# Patient Record
Sex: FEMALE | Race: WHITE | NOT HISPANIC OR LATINO | Employment: OTHER | URBAN - METROPOLITAN AREA
[De-identification: names, ages, dates, MRNs, and addresses within clinical notes are randomized per-mention and may not be internally consistent; named-entity substitution may affect disease eponyms.]

---

## 2017-10-30 ENCOUNTER — HOSPITAL ENCOUNTER (OUTPATIENT)
Dept: MAMMOGRAPHY | Facility: CLINIC | Age: 77
Discharge: HOME | End: 2017-10-30
Attending: FAMILY MEDICINE | Admitting: FAMILY MEDICINE
Payer: COMMERCIAL

## 2017-10-30 DIAGNOSIS — Z12.31 VISIT FOR SCREENING MAMMOGRAM: ICD-10-CM

## 2017-10-30 PROCEDURE — G0202 SCR MAMMO BI INCL CAD: HCPCS

## 2017-11-03 ENCOUNTER — OFFICE VISIT (OUTPATIENT)
Dept: FAMILY MEDICINE | Facility: CLINIC | Age: 77
End: 2017-11-03
Payer: COMMERCIAL

## 2017-11-03 VITALS
TEMPERATURE: 97.7 F | HEIGHT: 64 IN | HEART RATE: 72 BPM | BODY MASS INDEX: 31.86 KG/M2 | WEIGHT: 186.6 LBS | SYSTOLIC BLOOD PRESSURE: 138 MMHG | DIASTOLIC BLOOD PRESSURE: 80 MMHG

## 2017-11-03 DIAGNOSIS — E78.5 HYPERLIPIDEMIA LDL GOAL <130: ICD-10-CM

## 2017-11-03 DIAGNOSIS — G43.719 INTRACTABLE CHRONIC MIGRAINE WITHOUT AURA AND WITHOUT STATUS MIGRAINOSUS: Primary | ICD-10-CM

## 2017-11-03 DIAGNOSIS — Z23 NEED FOR PROPHYLACTIC VACCINATION AND INOCULATION AGAINST INFLUENZA: ICD-10-CM

## 2017-11-03 LAB
ALBUMIN SERPL-MCNC: 3.9 G/DL (ref 3.4–5)
ALP SERPL-CCNC: 64 U/L (ref 40–150)
ALT SERPL W P-5'-P-CCNC: 23 U/L (ref 0–50)
ANION GAP SERPL CALCULATED.3IONS-SCNC: 5 MMOL/L (ref 3–14)
AST SERPL W P-5'-P-CCNC: 21 U/L (ref 0–45)
BASOPHILS # BLD AUTO: 0 10E9/L (ref 0–0.2)
BASOPHILS NFR BLD AUTO: 0.4 %
BILIRUB DIRECT SERPL-MCNC: 0.1 MG/DL (ref 0–0.2)
BILIRUB SERPL-MCNC: 0.5 MG/DL (ref 0.2–1.3)
BUN SERPL-MCNC: 25 MG/DL (ref 7–30)
CALCIUM SERPL-MCNC: 9.1 MG/DL (ref 8.5–10.1)
CHLORIDE SERPL-SCNC: 110 MMOL/L (ref 94–109)
CHOLEST SERPL-MCNC: 126 MG/DL
CO2 SERPL-SCNC: 28 MMOL/L (ref 20–32)
CREAT SERPL-MCNC: 0.9 MG/DL (ref 0.52–1.04)
DIFFERENTIAL METHOD BLD: NORMAL
EOSINOPHIL # BLD AUTO: 0.2 10E9/L (ref 0–0.7)
EOSINOPHIL NFR BLD AUTO: 2.9 %
ERYTHROCYTE [DISTWIDTH] IN BLOOD BY AUTOMATED COUNT: 13.1 % (ref 10–15)
GFR SERPL CREATININE-BSD FRML MDRD: 61 ML/MIN/1.7M2
GLUCOSE SERPL-MCNC: 93 MG/DL (ref 70–99)
HCT VFR BLD AUTO: 41.9 % (ref 35–47)
HDLC SERPL-MCNC: 54 MG/DL
HGB BLD-MCNC: 13.5 G/DL (ref 11.7–15.7)
LDLC SERPL CALC-MCNC: 53 MG/DL
LYMPHOCYTES # BLD AUTO: 1.7 10E9/L (ref 0.8–5.3)
LYMPHOCYTES NFR BLD AUTO: 32.1 %
MCH RBC QN AUTO: 30.9 PG (ref 26.5–33)
MCHC RBC AUTO-ENTMCNC: 32.2 G/DL (ref 31.5–36.5)
MCV RBC AUTO: 96 FL (ref 78–100)
MONOCYTES # BLD AUTO: 0.5 10E9/L (ref 0–1.3)
MONOCYTES NFR BLD AUTO: 9.4 %
NEUTROPHILS # BLD AUTO: 2.9 10E9/L (ref 1.6–8.3)
NEUTROPHILS NFR BLD AUTO: 55.2 %
NONHDLC SERPL-MCNC: 72 MG/DL
PLATELET # BLD AUTO: 211 10E9/L (ref 150–450)
POTASSIUM SERPL-SCNC: 4.4 MMOL/L (ref 3.4–5.3)
PROT SERPL-MCNC: 7.2 G/DL (ref 6.8–8.8)
RBC # BLD AUTO: 4.37 10E12/L (ref 3.8–5.2)
SODIUM SERPL-SCNC: 143 MMOL/L (ref 133–144)
TRIGL SERPL-MCNC: 95 MG/DL
TSH SERPL DL<=0.005 MIU/L-ACNC: 3.02 MU/L (ref 0.4–4)
WBC # BLD AUTO: 5.2 10E9/L (ref 4–11)

## 2017-11-03 PROCEDURE — 36415 COLL VENOUS BLD VENIPUNCTURE: CPT | Performed by: FAMILY MEDICINE

## 2017-11-03 PROCEDURE — 85025 COMPLETE CBC W/AUTO DIFF WBC: CPT | Performed by: FAMILY MEDICINE

## 2017-11-03 PROCEDURE — 99214 OFFICE O/P EST MOD 30 MIN: CPT | Mod: 25 | Performed by: FAMILY MEDICINE

## 2017-11-03 PROCEDURE — 80076 HEPATIC FUNCTION PANEL: CPT | Performed by: FAMILY MEDICINE

## 2017-11-03 PROCEDURE — G0008 ADMIN INFLUENZA VIRUS VAC: HCPCS | Performed by: FAMILY MEDICINE

## 2017-11-03 PROCEDURE — 80061 LIPID PANEL: CPT | Performed by: FAMILY MEDICINE

## 2017-11-03 PROCEDURE — 80048 BASIC METABOLIC PNL TOTAL CA: CPT | Performed by: FAMILY MEDICINE

## 2017-11-03 PROCEDURE — 84443 ASSAY THYROID STIM HORMONE: CPT | Performed by: FAMILY MEDICINE

## 2017-11-03 PROCEDURE — 90662 IIV NO PRSV INCREASED AG IM: CPT | Performed by: FAMILY MEDICINE

## 2017-11-03 RX ORDER — BUTALBITAL, ACETAMINOPHEN AND CAFFEINE 50; 325; 40 MG/1; MG/1; MG/1
1-2 TABLET ORAL EVERY 4 HOURS PRN
Qty: 100 TABLET | Refills: 2 | Status: SHIPPED | OUTPATIENT
Start: 2017-11-03 | End: 2017-11-03

## 2017-11-03 RX ORDER — BUTALBITAL, ACETAMINOPHEN AND CAFFEINE 50; 325; 40 MG/1; MG/1; MG/1
1-2 TABLET ORAL EVERY 4 HOURS PRN
Qty: 100 TABLET | Refills: 2 | Status: SHIPPED | OUTPATIENT
Start: 2017-11-03 | End: 2018-11-20

## 2017-11-03 RX ORDER — SIMVASTATIN 20 MG
20 TABLET ORAL AT BEDTIME
Qty: 90 TABLET | Refills: 3 | Status: SHIPPED | OUTPATIENT
Start: 2017-11-03 | End: 2018-11-20

## 2017-11-03 RX ORDER — PROCHLORPERAZINE MALEATE 5 MG
5 TABLET ORAL 3 TIMES DAILY PRN
Qty: 30 TABLET | Refills: 2 | Status: SHIPPED | OUTPATIENT
Start: 2017-11-03 | End: 2018-11-20

## 2017-11-03 NOTE — NURSING NOTE
"No chief complaint on file.      Initial /80 (BP Location: Right arm, Patient Position: Chair, Cuff Size: Adult Large)  Pulse 72  Temp 97.7  F (36.5  C) (Tympanic)  Ht 5' 4\" (1.626 m)  Wt 186 lb 9.6 oz (84.6 kg)  BMI 32.03 kg/m2 Estimated body mass index is 32.03 kg/(m^2) as calculated from the following:    Height as of this encounter: 5' 4\" (1.626 m).    Weight as of this encounter: 186 lb 9.6 oz (84.6 kg).  Medication Reconciliation: complete    Health Maintenance that is potentially due pending provider review:  Mammogram and Colonoscopy/FIT    Pt declines to have health maintenence.    Is there anyone who you would like to be able to receive your results? No  If yes have patient fill out BENTON      "

## 2017-11-03 NOTE — PATIENT INSTRUCTIONS
Labs today     Flu shot today     Medications refilled today for one year     Recheck with me in one year sooner if needed

## 2017-11-03 NOTE — MR AVS SNAPSHOT
After Visit Summary   11/3/2017    Susana Duenas    MRN: [id number]           Patient Information     Date Of Birth          1940        Visit Information        Provider Department      11/3/2017 8:00 AM Heena Ny MD [facility]        Today's Diagnoses     Need for prophylactic vaccination and inoculation against influenza    -  1    Intractable chronic migraine without aura and without status migrainosus        Hyperlipidemia LDL goal <130          Care Instructions    Labs today     Flu shot today     Medications refilled today for one year     Recheck with me in one year sooner if needed          Follow-ups after your visit        Who to contact     If you have questions or need follow up information about today's clinic visit or your schedule please contact [facility] directly at [phone].  Normal or non-critical lab and imaging results will be communicated to you by [employer], letter or phone within 4 business days after the clinic has received the results. If you do not hear from us within 7 days, please contact the clinic through [employer] or phone. If you have a critical or abnormal lab result, we will notify you by phone as soon as possible.  Submit refill requests through [employer] or call your pharmacy and they will forward the refill request to us. Please allow 3 business days for your refill to be completed.          Additional Information About Your Visit        [employer] Information     [employer] gives you secure access to your electronic health record. If you see a primary care provider, you can also send messages to your care team and make appointments. If you have questions, please call your primary care clinic.  If you do not have a primary care provider, please call [phone] and they will assist you.        Care EveryWhere ID     This is your Care EveryWhere ID. This could be used by other organizations to access your [place]  "medical records  [id number]        Your Vitals Were     Pulse Temperature Height BMI (Body Mass Index)          72 97.7  F (36.5  C) (Tympanic) 5' 4\" (1.626 m) 32.03 kg/m2         Blood Pressure from Last 3 Encounters:   11/03/17 138/80   11/14/16 138/84   10/30/15 136/80    Weight from Last 3 Encounters:   11/03/17 186 lb 9.6 oz (84.6 kg)   11/14/16 187 lb 9.6 oz (85.1 kg)   10/30/15 189 lb 6.4 oz (85.9 kg)              We Performed the Following     ADMIN INFLUENZA (For MEDICARE Patients ONLY) []     Basic metabolic panel     CBC with platelets differential     FLU VACCINE, INCREASED ANTIGEN, PRESV FREE, AGE 65+ [96496]     Hepatic panel     Lipid panel reflex to direct LDL Fasting     TSH with free T4 reflex          Where to get your medicines      These medications were sent to [facility] [address] [place], [place] [address] [facility]  [address] [facility] [address]     Phone:  [phone]     prochlorperazine 5 MG tablet    simvastatin 20 MG tablet         Some of these will need a paper prescription and others can be bought over the counter.  Ask your nurse if you have questions.     Bring a paper prescription for each of these medications     butalbital-acetaminophen-caffeine -40 MG per tablet          Primary Care Provider Office Phone # Fax #    Heena Ny [phone] [address] [place] [address]        Equal Access to Services     ÁNGELA SNYDER AH: Hadii aad ku hadasho Soomaali, waaxda luqadaha, qaybta kaalmada adeegyada, waxay vaishaliin felicia guido. So [facility] [phone].    ATENCIÓN: Si habla español, tiene a sanches disposición servicios gratuitos de asistencia lingüística. Llame al [phone].    We comply with applicable federal civil rights laws and [place] laws. We do not discriminate on the basis of race, color, national origin, age, disability, sex, sexual orientation, or gender identity.            Thank you!     " Thank you for choosing [facility]  for your care. Our goal is always to provide you with excellent care. Hearing back from our patients is one way we can continue to improve our services. Please take a few minutes to complete the written survey that you may receive in the mail after your visit with us. Thank you!             Your Updated Medication List - Protect others around you: Learn how to safely use, store and throw away your medicines at www.disposemymeds.org.          This list is accurate as of: 11/3/17  8:32 AM.  Always use your most recent med list.                   Brand Name Dispense Instructions for use Diagnosis    ALEVE 220 MG capsule   Generic drug:  naproxen sodium      1 CAPSULE EVERY 12 HOURS AS NEEDED        BL FLAX SEED OIL 1000 MG Caps      2 tablets daily        butalbital-acetaminophen-caffeine -40 MG per tablet    FIORICET/ESGIC    100 tablet    Take 1-2 tablets by mouth every 4 hours as needed for pain    Intractable chronic migraine without aura and without status migrainosus       GRAPE SEED PO      300mg daily        other medical supplies      Multivitamin packet including calcium and Vit D.    Migraine headache       prochlorperazine 5 MG tablet    COMPAZINE    30 tablet    Take 1 tablet (5 mg) by mouth 3 times daily as needed for nausea.    Intractable chronic migraine without aura and without status migrainosus       simvastatin 20 MG tablet    ZOCOR    90 tablet    Take 1 tablet (20 mg) by mouth At Bedtime    Hyperlipidemia LDL goal <130

## 2017-11-03 NOTE — PROGRESS NOTES
"  SUBJECTIVE:   Susana Duenas is a 77 year old female who presents to clinic today for the following health issues:      Hyperlipidemia Follow-Up      Rate your low fat/cholesterol diet?: fair    Taking statin?  Yes, no muscle aches from statin    Other lipid medications/supplements?:  none    Migraine Follow-Up    Headaches symptoms:  Stable     Frequency: varies, sometimes a week, sometimes 2 days, sometimes longer     Duration of headaches: a couple hours with meds    Able to do normal daily activities/work with migraines: No -     Rescue/Relief medication:fioricet              Effectiveness: moderate relief    Preventative medication: None    Neurologic complications: No new stroke-like symptoms, loss of vision or speech, numbness or weakness    In the past 4 weeks, how often have you gone to Urgent Care or the emergency room because of your headaches?  0          Amount of exercise or physical activity: None    Problems taking medications regularly: No    Medication side effects: none  Diet: regular (no restrictions)          Problem list and histories reviewed & adjusted, as indicated.  Additional history: as documented    Labs reviewed in EPIC    Reviewed and updated as needed this visit by clinical staffTobacco  Allergies  Meds  Problems  Med Hx  Surg Hx  Fam Hx  Soc Hx        Reviewed and updated as needed this visit by Provider  Allergies  Meds  Problems         ROS:  Constitutional, HEENT, cardiovascular, pulmonary, gi and gu systems are negative, except as otherwise noted.      OBJECTIVE:                                                    /80 (BP Location: Right arm, Patient Position: Chair, Cuff Size: Adult Large)  Pulse 72  Temp 97.7  F (36.5  C) (Tympanic)  Ht 5' 4\" (1.626 m)  Wt 186 lb 9.6 oz (84.6 kg)  BMI 32.03 kg/m2  Body mass index is 32.03 kg/(m^2).  GENERAL APPEARANCE: healthy, alert and no distress  RESP: lungs clear to auscultation - no rales, rhonchi or wheezes  CV: " regular rates and rhythm, normal S1 S2, no S3 or S4 and no murmur, click or rub  MS: extremities normal- no gross deformities noted  PSYCH: mentation appears normal and affect normal/bright         ASSESSMENT/PLAN:                                                    1. Intractable chronic migraine without aura and without status migrainosus  Stable no change in treatment plan.   - prochlorperazine (COMPAZINE) 5 MG tablet; Take 1 tablet (5 mg) by mouth 3 times daily as needed for nausea.  Dispense: 30 tablet; Refill: 2  - Basic metabolic panel  - CBC with platelets differential  - TSH with free T4 reflex  - butalbital-acetaminophen-caffeine (FIORICET/ESGIC) -40 MG per tablet; Take 1-2 tablets by mouth every 4 hours as needed for pain  Dispense: 100 tablet; Refill: 2    2. Hyperlipidemia LDL goal <130  Stable no change in treatment plan.   - simvastatin (ZOCOR) 20 MG tablet; Take 1 tablet (20 mg) by mouth At Bedtime  Dispense: 90 tablet; Refill: 3  - Hepatic panel  - Lipid panel reflex to direct LDL Fasting    3. Need for prophylactic vaccination and inoculation against influenza    - FLU VACCINE, INCREASED ANTIGEN, PRESV FREE, AGE 65+ [82861]  - ADMIN INFLUENZA (For MEDICARE Patients ONLY) []      Patient Instructions   Labs today     Flu shot today     Medications refilled today for one year     Recheck with me in one year sooner if needed    Risks, benefits, side effects and rationale for treatment plan fully discussed with the patient and understanding expressed.     Heena Ny MD  [facility]    Injectable Influenza Immunization Documentation    1.  Is the person to be vaccinated sick today?   No    2. Does the person to be vaccinated have an allergy to a component   of the vaccine?   No  Egg Allergy Algorithm Link    3. Has the person to be vaccinated ever had a serious reaction   to influenza vaccine in the past?   No    4. Has the person to be vaccinated ever had  Guillain-Barré syndrome?   No    Form completed by patient

## 2017-11-06 ENCOUNTER — TELEPHONE (OUTPATIENT)
Dept: FAMILY MEDICINE | Facility: CLINIC | Age: 77
End: 2017-11-06

## 2017-11-08 ENCOUNTER — TELEPHONE (OUTPATIENT)
Dept: FAMILY MEDICINE | Facility: CLINIC | Age: 77
End: 2017-11-08

## 2017-11-08 NOTE — TELEPHONE ENCOUNTER
Please call pt and let her know that her migraine med is not covered by her insurance   I would recommend trying naprosyn or ibuprofen with compazine that she has and a caffeine containing beverage like coke is she willing to do this?  Las we talked she rarely had migraines

## 2017-11-08 NOTE — TELEPHONE ENCOUNTER
esgic is nolonger formulary per [facility].  Preferred are meloxicam, Ibu, Naproxen or imitrex and naratriptan. Please fax new rx if OK to change.

## 2017-11-09 NOTE — TELEPHONE ENCOUNTER
Left message to call please give her message below from Dr. Ny. She can also check with pharmacy and see how mush it would cost to pay out of pocket for 10 or 20 pills instead of 100?

## 2018-06-28 ENCOUNTER — OFFICE VISIT (OUTPATIENT)
Dept: FAMILY MEDICINE | Facility: CLINIC | Age: 78
End: 2018-06-28
Payer: COMMERCIAL

## 2018-06-28 VITALS
HEIGHT: 64 IN | HEART RATE: 72 BPM | DIASTOLIC BLOOD PRESSURE: 76 MMHG | SYSTOLIC BLOOD PRESSURE: 134 MMHG | TEMPERATURE: 98.8 F | WEIGHT: 187 LBS | BODY MASS INDEX: 31.92 KG/M2 | RESPIRATION RATE: 18 BRPM

## 2018-06-28 DIAGNOSIS — H92.01 MASTOID PAIN, RIGHT: Primary | ICD-10-CM

## 2018-06-28 DIAGNOSIS — H61.21 IMPACTED CERUMEN OF RIGHT EAR: ICD-10-CM

## 2018-06-28 DIAGNOSIS — G43.719 INTRACTABLE CHRONIC MIGRAINE WITHOUT AURA AND WITHOUT STATUS MIGRAINOSUS: ICD-10-CM

## 2018-06-28 PROCEDURE — 69210 REMOVE IMPACTED EAR WAX UNI: CPT | Mod: RT | Performed by: PHYSICIAN ASSISTANT

## 2018-06-28 NOTE — MR AVS SNAPSHOT
After Visit Summary   6/28/2018    Susana Duenas    MRN: [id number]           Patient Information     Date Of Birth          1940        Visit Information        Provider Department      6/28/2018 10:20 AM Tiffanie Mayes PA-C [facility]        Care Instructions    Seems unlikely trigeminal neuralgia, but is not ear drum infection, unlikely ear canal infection (red now that we removed wax, but didn't seem red before), not shingles of the ear.  Unlikely infection of mastoid despite this is where it hurts, since no fever.    Ice or heat   Can try acetaminophen, up to 3 of the regular strength 325 mg tabs or 2 of the 500 mg tabs, every 4-6 hrs but max 4 doses per day  Can overlap acetaminophen with either ibuprofen or naproxen, but not both  Ibuprofen 2 of the 200 mg tabs, every 4-6 hrs, take with food   OR naproxen 1 of the 220 mg tabs, every 8-12 hrs, take with food    Call to update tomorrow if still hurting badly          Follow-ups after your visit        Who to contact     If you have questions or need follow up information about today's clinic visit or your schedule please contact [facility] directly at [phone].  Normal or non-critical lab and imaging results will be communicated to you by [employer], letter or phone within 4 business days after the clinic has received the results. If you do not hear from us within 7 days, please contact the clinic through [employer] or phone. If you have a critical or abnormal lab result, we will notify you by phone as soon as possible.  Submit refill requests through [employer] or call your pharmacy and they will forward the refill request to us. Please allow 3 business days for your refill to be completed.          Additional Information About Your Visit        [employer] Information     [employer] gives you secure access to your electronic health record. If you see a primary care provider, you can also send messages to  "your care team and make appointments. If you have questions, please call your primary care clinic.  If you do not have a primary care provider, please call [phone] and they will assist you.        Care EveryWhere ID     This is your Care EveryWhere ID. This could be used by other organizations to access your [place] medical records  [id number]        Your Vitals Were     Pulse Temperature Respirations Height BMI (Body Mass Index)       72 98.8  F (37.1  C) (Tympanic) 18 5' 4\" (1.626 m) 32.1 kg/m2        Blood Pressure from Last 3 Encounters:   06/28/18 134/76   11/03/17 138/80   11/14/16 138/84    Weight from Last 3 Encounters:   06/28/18 187 lb (84.8 kg)   11/03/17 186 lb 9.6 oz (84.6 kg)   11/14/16 187 lb 9.6 oz (85.1 kg)              Today, you had the following     No orders found for display       Primary Care Provider Office Phone # Fax #    Heena Ny [phone] [address] [place] [address]        Equal Access to Services     [facility]rem[facility] Medical CenterELISHA AH: Hadii aad ku hadasho Somelaniaali, waaxda luqadaha, qaybta kaalmada adeegyada, perlita alberts . So [facility] [phone].    ATENCIÓN: Si habla español, tiene a sanches disposición servicios gratuitos de asistencia lingüística. Llame al [phone].    We comply with applicable federal civil rights laws and [place] laws. We do not discriminate on the basis of race, color, national origin, age, disability, sex, sexual orientation, or gender identity.            Thank you!     Thank you for choosing [facility]  for your care. Our goal is always to provide you with excellent care. Hearing back from our patients is one way we can continue to improve our services. Please take a few minutes to complete the written survey that you may receive in the mail after your visit with us. Thank you!             Your Updated Medication List - Protect others around you: Learn how to safely use, store and " throw away your medicines at www.disposemymeds.org.          This list is accurate as of 6/28/18 11:54 AM.  Always use your most recent med list.                   Brand Name Dispense Instructions for use Diagnosis    ALEVE 220 MG capsule   Generic drug:  naproxen sodium      1 CAPSULE EVERY 12 HOURS AS NEEDED        BL FLAX SEED OIL 1000 MG Caps      2 tablets daily        butalbital-acetaminophen-caffeine -40 MG per tablet    FIORICET/ESGIC    100 tablet    Take 1-2 tablets by mouth every 4 hours as needed for pain    Intractable chronic migraine without aura and without status migrainosus       GRAPE SEED PO      300mg daily        other medical supplies      Multivitamin packet including calcium and Vit D.    Migraine headache       prochlorperazine 5 MG tablet    COMPAZINE    30 tablet    Take 1 tablet (5 mg) by mouth 3 times daily as needed for nausea.    Intractable chronic migraine without aura and without status migrainosus       simvastatin 20 MG tablet    ZOCOR    90 tablet    Take 1 tablet (20 mg) by mouth At Bedtime    Hyperlipidemia LDL goal <130

## 2018-06-28 NOTE — PROGRESS NOTES
SUBJECTIVE:   Susana Duenas is a 77 year old female who presents to clinic today for the following health issues:    Concern - Ear pain  Onset: two days    Description:   Patient's right ear started hurting last night. It kept her awake all night.    Intensity: moderate    Progression of Symptoms: slightly improving    Accompanying Signs & Symptoms:  Shooting pain    Previous history of similar problem:   none    Precipitating factors:   Worsened by: none    Alleviating factors:  Improved by: none    Therapies Tried and outcome: none    Started yesterday, not too bad, but then couldn't sleep due to shooting pain.  Behind ear predominantly and in ear somewhat but she mostly points to tragus, no pain at antitragus.  Hearing ok.  No fever.  No history jaw troubles or pain in face.  Does admit to being lifelong teeth clencher and  but no history TMJ.  No recent URI or seasonal allergies.  Somewhat relates symptoms as being similar to 2011 pain at mastoid which Dr Lara had tentatively diagnosed as trigeminal neuralgia - patient doesn't remember this so clearly but thinks simply resolved.  Has symptoms migraines, but these pains very differenet.      Problem list and histories reviewed & adjusted, as indicated.  Additional history: as documented    BP Readings from Last 3 Encounters:   06/28/18 134/76   11/03/17 138/80   11/14/16 138/84    Wt Readings from Last 3 Encounters:   06/28/18 187 lb (84.8 kg)   11/03/17 186 lb 9.6 oz (84.6 kg)   11/14/16 187 lb 9.6 oz (85.1 kg)         Labs reviewed in EPIC    Reviewed and updated as needed this visit by clinical staff  Tobacco  Allergies  Meds  Problems  Med Hx  Surg Hx  Fam Hx  Soc Hx        Reviewed and updated as needed this visit by Provider  Tobacco  Allergies  Meds  Problems  Med Hx  Surg Hx  Fam Hx  Soc Hx          ROS:  Constitutional, HEENT, neuro systems are negative, except as otherwise noted.    OBJECTIVE:     /76 (BP Location: Right  "arm, Cuff Size: Adult Regular)  Pulse 72  Temp 98.8  F (37.1  C) (Tympanic)  Resp 18  Ht 5' 4\" (1.626 m)  Wt 187 lb (84.8 kg)  BMI 32.1 kg/m2  Body mass index is 32.1 kg/(m^2).  GENERAL: healthy, alert and no distress  HENT: ear canal and TM's normal L, R canal with cerumen impaction partially lavaged by CMA then manually removed by me, after lavage note considerable canal erythema that is was not visible in portion of canal visible before cerumen removal but no edema, nasal mucosa unremarkable  NECK: no adenopathy, no asymmetry, masses, or scars     11/2017 GFR 61    ASSESSMENT/PLAN:       ICD-10-CM    1. Mastoid pain, right H92.01    2. Impacted cerumen of right ear H61.21 HC REMOVAL IMPACTED CERUMEN IRRIGATION/LVG UNILAT   3. Intractable chronic migraine without aura and without status migrainosus G43.719      Patient and patient care were discussed with Ricci Moncada MD.  Patient Instructions   Seems unlikely trigeminal neuralgia, but is not ear drum infection, unlikely ear canal infection (red now that we removed wax, but didn't seem red before), not shingles of the ear.  Unlikely infection of mastoid despite this is where it hurts, since no fever.    Ice or heat   Can try acetaminophen, up to 3 of the regular strength 325 mg tabs or 2 of the 500 mg tabs, every 4-6 hrs but max 4 doses per day  Can overlap acetaminophen with either ibuprofen or naproxen, but not both  Ibuprofen 2 of the 200 mg tabs, every 4-6 hrs, take with food   OR naproxen 1 of the 220 mg tabs, every 8-12 hrs, take with food    Call to update tomorrow if still hurting badly    Dr Moncada aware of this issue      Tiffanie Mayes PA-C  [facility]  "

## 2018-06-28 NOTE — PATIENT INSTRUCTIONS
Seems unlikely trigeminal neuralgia, but is not ear drum infection, unlikely ear canal infection (red now that we removed wax, but didn't seem red before), not shingles of the ear.  Unlikely infection of mastoid despite this is where it hurts, since no fever.    Ice or heat   Can try acetaminophen, up to 3 of the regular strength 325 mg tabs or 2 of the 500 mg tabs, every 4-6 hrs but max 4 doses per day  Can overlap acetaminophen with either ibuprofen or naproxen, but not both  Ibuprofen 2 of the 200 mg tabs, every 4-6 hrs, take with food   OR naproxen 1 of the 220 mg tabs, every 8-12 hrs, take with food    Call to update tomorrow if still hurting badly    Dr Moncada aware of this issue

## 2018-06-28 NOTE — NURSING NOTE
"Chief Complaint   Patient presents with     Otalgia       Initial /76 (BP Location: Right arm, Cuff Size: Adult Regular)  Pulse 72  Temp 98.8  F (37.1  C) (Tympanic)  Resp 18  Ht 5' 4\" (1.626 m)  Wt 187 lb (84.8 kg)  BMI 32.1 kg/m2 Estimated body mass index is 32.1 kg/(m^2) as calculated from the following:    Height as of this encounter: 5' 4\" (1.626 m).    Weight as of this encounter: 187 lb (84.8 kg).      Health Maintenance that is potentially due pending provider review:  NONE    Is there anyone who you would like to be able to receive your results? No  If yes have patient fill out BENTON      "

## 2018-09-11 ENCOUNTER — RADIANT APPOINTMENT (OUTPATIENT)
Dept: GENERAL RADIOLOGY | Facility: CLINIC | Age: 78
End: 2018-09-11
Attending: FAMILY MEDICINE
Payer: COMMERCIAL

## 2018-09-11 ENCOUNTER — OFFICE VISIT (OUTPATIENT)
Dept: FAMILY MEDICINE | Facility: CLINIC | Age: 78
End: 2018-09-11
Payer: COMMERCIAL

## 2018-09-11 VITALS
OXYGEN SATURATION: 94 % | WEIGHT: 186 LBS | HEIGHT: 64 IN | DIASTOLIC BLOOD PRESSURE: 70 MMHG | TEMPERATURE: 99.2 F | HEART RATE: 80 BPM | SYSTOLIC BLOOD PRESSURE: 134 MMHG | BODY MASS INDEX: 31.76 KG/M2

## 2018-09-11 DIAGNOSIS — M25.552 HIP PAIN, LEFT: ICD-10-CM

## 2018-09-11 DIAGNOSIS — M25.552 HIP PAIN, LEFT: Primary | ICD-10-CM

## 2018-09-11 PROCEDURE — 73523 X-RAY EXAM HIPS BI 5/> VIEWS: CPT | Mod: FY

## 2018-09-11 PROCEDURE — 99213 OFFICE O/P EST LOW 20 MIN: CPT | Performed by: FAMILY MEDICINE

## 2018-09-11 RX ORDER — MELOXICAM 7.5 MG/1
7.5 TABLET ORAL DAILY
Qty: 21 TABLET | Refills: 1 | Status: SHIPPED | DISCHARGE
Start: 2018-09-11 | End: 2018-11-20

## 2018-09-11 NOTE — MR AVS SNAPSHOT
After Visit Summary   9/11/2018    Susana Duenas    MRN: [id number]           Patient Information     Date Of Birth          1940        Visit Information        Provider Department      9/11/2018 1:40 PM Ricci Laird MD [facility]        Today's Diagnoses     Hip pain, left    -  1      Care Instructions    1. I will call once radiology sees the images.    2. This looks more like bursitis than arthritis     3. Use the medication for three weeks and come back if not improved.          Follow-ups after your visit        Who to contact     If you have questions or need follow up information about today's clinic visit or your schedule please contact [facility] directly at [phone].  Normal or non-critical lab and imaging results will be communicated to you by Ecolibriumhart, letter or phone within 4 business days after the clinic has received the results. If you do not hear from us within 7 days, please contact the clinic through Ecolibriumhart or phone. If you have a critical or abnormal lab result, we will notify you by phone as soon as possible.  Submit refill requests through Predictify or call your pharmacy and they will forward the refill request to us. Please allow 3 business days for your refill to be completed.          Additional Information About Your Visit        MyChart Information     Predict[employer] gives you secure access to your electronic health record. If you see a primary care provider, you can also send messages to your care team and make appointments. If you have questions, please call your primary care clinic.  If you do not have a primary care provider, please call [phone] and they will assist you.        Care EveryWhere ID     This is your Care EveryWhere ID. This could be used by other organizations to access your [place] medical records  [id number]        Your Vitals Were     Pulse Temperature Height Pulse Oximetry BMI (Body Mass  "Index)       80 99.2  F (37.3  C) (Tympanic) 5' 4\" (1.626 m) 94% 31.93 kg/m2        Blood Pressure from Last 3 Encounters:   09/11/18 134/70   06/28/18 134/76   11/03/17 138/80    Weight from Last 3 Encounters:   09/11/18 186 lb (84.4 kg)   06/28/18 187 lb (84.8 kg)   11/03/17 186 lb 9.6 oz (84.6 kg)                 Today's Medication Changes          These changes are accurate as of 9/11/18  2:13 PM.  If you have any questions, ask your nurse or doctor.               Start taking these medicines.        Dose/Directions    meloxicam 7.5 MG tablet   Commonly known as:  MOBIC   Used for:  Hip pain, left   Started by:  Ricci Laird MD        Dose:  7.5 mg   Take 1 tablet (7.5 mg) by mouth daily   Quantity:  21 tablet   Refills:  1            Where to get your medicines      Information about where to get these medications is not yet available     ! Ask your nurse or doctor about these medications     meloxicam 7.5 MG tablet                Primary Care Provider Office Phone # Fax #    Heena Ny [phone] [address] [place], [place] [address]        Equal Access to Services     ÁNGELA SNYDER AH: Hadtani portillo hadasho Soomaali, waaxda luqadaha, qaybta kaalmada adeegyada, waxay idiin haymiken jose guido. So [facility] [phone].    ATENCIÓN: Si habla español, tiene a sanches disposición servicios gratuitos de asistencia lingüística. Llame al [phone].    We comply with applicable federal civil rights laws and [place] laws. We do not discriminate on the basis of race, color, national origin, age, disability, sex, sexual orientation, or gender identity.            Thank you!     Thank you for choosing [facility]  for your care. Our goal is always to provide you with excellent care. Hearing back from our patients is one way we can continue to improve our services. Please take a few minutes to complete the written survey that you may receive in the mail " after your visit with us. Thank you!             Your Updated Medication List - Protect others around you: Learn how to safely use, store and throw away your medicines at www.disposemymeds.org.          This list is accurate as of 9/11/18  2:13 PM.  Always use your most recent med list.                   Brand Name Dispense Instructions for use Diagnosis    ALEVE 220 MG capsule   Generic drug:  naproxen sodium      1 CAPSULE EVERY 12 HOURS AS NEEDED        BL FLAX SEED OIL 1000 MG Caps      2 tablets daily        butalbital-acetaminophen-caffeine -40 MG per tablet    FIORICET/ESGIC    100 tablet    Take 1-2 tablets by mouth every 4 hours as needed for pain    Intractable chronic migraine without aura and without status migrainosus       GRAPE SEED PO      300mg daily        meloxicam 7.5 MG tablet    MOBIC    21 tablet    Take 1 tablet (7.5 mg) by mouth daily    Hip pain, left       other medical supplies      Multivitamin packet including calcium and Vit D.    Migraine headache       prochlorperazine 5 MG tablet    COMPAZINE    30 tablet    Take 1 tablet (5 mg) by mouth 3 times daily as needed for nausea.    Intractable chronic migraine without aura and without status migrainosus       simvastatin 20 MG tablet    ZOCOR    90 tablet    Take 1 tablet (20 mg) by mouth At Bedtime    Hyperlipidemia LDL goal <130

## 2018-09-11 NOTE — PROGRESS NOTES
"  SUBJECTIVE:   Susana Duenas is a 77 year old female who presents to clinic today for the following health issues:      Joint Pain  She has left hip pain at night and with exertion.      Onset: 2 years off/on worse since last winter    Description:   Location: left leg  Character:\" aching\" worse with overuse or noticing more pain when laying down for the night    Intensity: Mild to moderate \"nagging\"    Progression of Symptoms: worse    Accompanying Signs & Symptoms:  Other symptoms: maybe some balance issues because of it, reports no falls    History:   Previous similar pain: YES      Precipitating factors:   Trauma or overuse: no     Alleviating factors:  Improved by: Aleve, massage, helped for a short while, ice. Ibuprofen    Therapies Tried and outcome: Aleve, helps her sleep            Problem list and histories reviewed & adjusted, as indicated.  Additional history: as documented    Patient Active Problem List   Diagnosis     Migraine headache     Menopausal syndrome (hot flashes)     Advance Care Planning     Health Care Home     Colonoscopy refused     Senile nuclear sclerosis     Hyperlipidemia LDL goal <130     Past Surgical History:   Procedure Laterality Date     GYN SURGERY  1979    total hyst- benign     HYSTERECTOMY, PAP NO LONGER INDICATED  1979    TAHBSO-endometriosis     ORTHOPEDIC SURGERY  1999    right ankle fracture     PHACOEMULSIFICATION WITH STANDARD INTRAOCULAR LENS IMPLANT Right 5/28/2015    Procedure: PHACOEMULSIFICATION WITH STANDARD INTRAOCULAR LENS IMPLANT;  Surgeon: Laz Camara MD;  Location: [facility]     PHACOEMULSIFICATION WITH STANDARD INTRAOCULAR LENS IMPLANT Left 6/18/2015    Procedure: PHACOEMULSIFICATION WITH STANDARD INTRAOCULAR LENS IMPLANT;  Surgeon: Laz Camara MD;  Location: [facility]     REPAIR HAMMER TOE  1/7/2014    Procedure: REPAIR HAMMER TOE;  bilateral 2nd & 3rd hammer toe repair;  Surgeon: Isauro Basurto MD;  Location: [facility]     SURGICAL HISTORY OF -   " "    ORIF right ankle     SURGICAL HISTORY OF -       bunions both feet       Social History   Substance Use Topics     Smoking status: Former Smoker     Packs/day: 0.50     Years: 15.00     Types: Cigarettes     Quit date: 12/12/1987     Smokeless tobacco: Never Used      Comment: quit 20 years ago.  Smoked off and on for 20 years     Alcohol use No     Family History   Problem Relation Age of Onset     C.A.D. Father      Cardiovascular Father          Current Outpatient Prescriptions   Medication Sig Dispense Refill     ALEVE 220 MG OR CAPS 1 CAPSULE EVERY 12 HOURS AS NEEDED       BL FLAX SEED OIL 1000 MG OR CAPS 2 tablets daily       GRAPE SEED OR 300mg daily       OTHER MEDICAL SUPPLIES Multivitamin packet including calcium and Vit D.       simvastatin (ZOCOR) 20 MG tablet Take 1 tablet (20 mg) by mouth At Bedtime 90 tablet 3     butalbital-acetaminophen-caffeine (FIORICET/ESGIC) -40 MG per tablet Take 1-2 tablets by mouth every 4 hours as needed for pain (Patient not taking: Reported on 9/11/2018) 100 tablet 2     prochlorperazine (COMPAZINE) 5 MG tablet Take 1 tablet (5 mg) by mouth 3 times daily as needed for nausea. (Patient not taking: Reported on 9/11/2018) 30 tablet 2       Reviewed and updated as needed this visit by clinical staff       Reviewed and updated as needed this visit by Provider         ROS:  CONSTITUTIONAL: NEGATIVE for fever, chills, change in weight  ENT/MOUTH: NEGATIVE for ear, mouth and throat problems  RESP: NEGATIVE for significant cough or SOB  CV: NEGATIVE for chest pain, palpitations or peripheral edema    OBJECTIVE:     /70 (BP Location: Right arm, Patient Position: Chair, Cuff Size: Adult Large)  Pulse 80  Temp 99.2  F (37.3  C) (Tympanic)  Ht 5' 4\" (1.626 m)  Wt 186 lb (84.4 kg)  SpO2 94%  BMI 31.93 kg/m2  Body mass index is 31.93 kg/(m^2).  GENERAL: healthy, alert and no distress  NECK: no adenopathy, no asymmetry, masses, or scars and thyroid normal to " palpation  MS: left hip pain with motion.  Some LOM restriction.         ASSESSMENT/PLAN:             1. Hip pain, left    Xray looks normal today   Suspect this is bursitis     - XR Pelvis and Hip Bilateral 2 Views; Future    ASSESSMENT/PLAN:      ICD-10-CM    1. Hip pain, left M25.552 XR Pelvis and Hip Bilateral 2 Views     meloxicam (MOBIC) 7.5 MG tablet       Patient Instructions   1. I will call once radiology sees the images.    2. This looks more like bursitis than arthritis     3. Use the medication for three weeks and come back if not improved.          Ricci Laird MD  [facility]

## 2018-09-11 NOTE — NURSING NOTE
"Initial /70 (BP Location: Right arm, Patient Position: Chair, Cuff Size: Adult Large)  Pulse 80  Temp 99.2  F (37.3  C) (Tympanic)  Ht 5' 4\" (1.626 m)  Wt 186 lb (84.4 kg)  SpO2 94%  BMI 31.93 kg/m2 Estimated body mass index is 31.93 kg/(m^2) as calculated from the following:    Height as of this encounter: 5' 4\" (1.626 m).    Weight as of this encounter: 186 lb (84.4 kg). .    Steffanie Maier    "

## 2018-09-25 ENCOUNTER — TELEPHONE (OUTPATIENT)
Dept: FAMILY MEDICINE | Facility: CLINIC | Age: 78
End: 2018-09-25

## 2018-09-25 NOTE — TELEPHONE ENCOUNTER
Reason for call:  Patient reporting a symptom    Symptom or request: Pt says she saw Dr Laird 9/11 for left hip bursa. She has taken the Meloxicam 7.5 mg daily for 2 weeks without relief. She says he said to come back in 3 weeks if no relief and she may need to get a shot in the hip. She wonders if she can come sooner.      Have you been treated for this before? Yes    Phone Number patient can be reached at:  Home number on file [phone] (home)    Best Time:  anytime    Can we leave a detailed message on this number:  YES    Call taken on 9/25/2018 at 10:02 AM by Katherine Hoff

## 2018-09-27 ENCOUNTER — OFFICE VISIT (OUTPATIENT)
Dept: FAMILY MEDICINE | Facility: CLINIC | Age: 78
End: 2018-09-27
Payer: COMMERCIAL

## 2018-09-27 VITALS
HEIGHT: 64 IN | TEMPERATURE: 98.9 F | SYSTOLIC BLOOD PRESSURE: 162 MMHG | OXYGEN SATURATION: 98 % | DIASTOLIC BLOOD PRESSURE: 88 MMHG | HEART RATE: 70 BPM | WEIGHT: 186 LBS | BODY MASS INDEX: 31.76 KG/M2

## 2018-09-27 DIAGNOSIS — M70.62 TROCHANTERIC BURSITIS OF LEFT HIP: Primary | ICD-10-CM

## 2018-09-27 PROCEDURE — 99213 OFFICE O/P EST LOW 20 MIN: CPT | Mod: 25 | Performed by: FAMILY MEDICINE

## 2018-09-27 PROCEDURE — 20610 DRAIN/INJ JOINT/BURSA W/O US: CPT | Mod: LT | Performed by: FAMILY MEDICINE

## 2018-09-27 RX ORDER — LIDOCAINE HYDROCHLORIDE 10 MG/ML
3 INJECTION, SOLUTION INFILTRATION; PERINEURAL ONCE
Qty: 3 ML | Refills: 0 | COMMUNITY
Start: 2018-09-27 | End: 2018-11-20

## 2018-09-27 NOTE — MR AVS SNAPSHOT
"              After Visit Summary   9/27/2018    Susana Duenas    MRN: [id number]           Patient Information     Date Of Birth          1940        Visit Information        Provider Department      9/27/2018 10:20 AM Ricci Laird MD [facility]        Care Instructions    1. This sure does appear to be bursitis.     2. The injection usually works well    3. If not better in three weeks come back    4. Stop the mobic.           Follow-ups after your visit        Who to contact     If you have questions or need follow up information about today's clinic visit or your schedule please contact [facility] directly at [phone].  Normal or non-critical lab and imaging results will be communicated to you by MyChart, letter or phone within 4 business days after the clinic has received the results. If you do not hear from us within 7 days, please contact the clinic through Matchbookhart or phone. If you have a critical or abnormal lab result, we will notify you by phone as soon as possible.  Submit refill requests through [employer] or call your pharmacy and they will forward the refill request to us. Please allow 3 business days for your refill to be completed.          Additional Information About Your Visit        MyChart Information     [employer] gives you secure access to your electronic health record. If you see a primary care provider, you can also send messages to your care team and make appointments. If you have questions, please call your primary care clinic.  If you do not have a primary care provider, please call [phone] and they will assist you.        Care EveryWhere ID     This is your Care EveryWhere ID. This could be used by other organizations to access your [place] medical records  [id number]        Your Vitals Were     Pulse Temperature Height Pulse Oximetry Breastfeeding? BMI (Body Mass Index)    70 98.9  F (37.2  C) (Tympanic) 5' 4\" (1.626 m) " 98% No 31.93 kg/m2       Blood Pressure from Last 3 Encounters:   09/27/18 162/88   09/11/18 134/70   06/28/18 134/76    Weight from Last 3 Encounters:   09/27/18 186 lb (84.4 kg)   09/11/18 186 lb (84.4 kg)   06/28/18 187 lb (84.8 kg)              Today, you had the following     No orders found for display       Primary Care Provider Office Phone # Fax #    Heena Ny [phone] [address] [place] [id number]        Equal Access to Services     [facility] : Hadii laura portillo haddeniso Sogeorges, wastacida luqadaha, qaybta kaalmada adecinthiayachuck, perlita alberts . So [facility] [phone].    ATENCIÓN: Si habla español, tiene a sanches disposición servicios gratuitos de asistencia lingüística. LlKettering Health [phone].    We comply with applicable federal civil rights laws and [place] laws. We do not discriminate on the basis of race, color, national origin, age, disability, sex, sexual orientation, or gender identity.            Thank you!     Thank you for choosing [facility]  for your care. Our goal is always to provide you with excellent care. Hearing back from our patients is one way we can continue to improve our services. Please take a few minutes to complete the written survey that you may receive in the mail after your visit with us. Thank you!             Your Updated Medication List - Protect others around you: Learn how to safely use, store and throw away your medicines at www.disposemymeds.org.          This list is accurate as of 9/27/18 10:59 AM.  Always use your most recent med list.                   Brand Name Dispense Instructions for use Diagnosis    ALEVE 220 MG capsule   Generic drug:  naproxen sodium      1 CAPSULE EVERY 12 HOURS AS NEEDED        BL FLAX SEED OIL 1000 MG Caps      2 tablets daily        butalbital-acetaminophen-caffeine -40 MG per tablet    FIORICET/ESGIC    100 tablet    Take 1-2 tablets by mouth every 4  hours as needed for pain    Intractable chronic migraine without aura and without status migrainosus       GRAPE SEED PO      300mg daily        meloxicam 7.5 MG tablet    MOBIC    21 tablet    Take 1 tablet (7.5 mg) by mouth daily    Hip pain, left       other medical supplies      Multivitamin packet including calcium and Vit D.    Migraine headache       prochlorperazine 5 MG tablet    COMPAZINE    30 tablet    Take 1 tablet (5 mg) by mouth 3 times daily as needed for nausea.    Intractable chronic migraine without aura and without status migrainosus       simvastatin 20 MG tablet    ZOCOR    90 tablet    Take 1 tablet (20 mg) by mouth At Bedtime    Hyperlipidemia LDL goal <130

## 2018-09-27 NOTE — NURSING NOTE
"Initial /88 (BP Location: Right arm, Patient Position: Sitting, Cuff Size: Adult Regular)  Pulse 70  Temp 98.9  F (37.2  C) (Tympanic)  Ht 5' 4\" (1.626 m)  Wt 186 lb (84.4 kg)  SpO2 98%  Breastfeeding? No  BMI 31.93 kg/m2 Estimated body mass index is 31.93 kg/(m^2) as calculated from the following:    Height as of this encounter: 5' 4\" (1.626 m).    Weight as of this encounter: 186 lb (84.4 kg). .    Zara Riggs CMA ([facility])  "

## 2018-09-27 NOTE — PROGRESS NOTES
SUBJECTIVE:   Susana Duenas is a 77 year old female who presents to clinic today for the following health issues:    Musculoskeletal problem/pain - she is here for a f/u and wants to discuss getting an injection.   She did not seem to get any better with the oral nonsteroidal anti-inflammatories        Duration: ongoing for over 2 years - this is a f/u visit from 9/11/18    Description  Location: Left hip and down the left leg to the knee.     Intensity:  moderate    Accompanying signs and symptoms: pain with walking.     History  Previous similar problem: YES  Previous evaluation:  x-ray    Precipitating or alleviating factors:  Trauma or overuse: no   Aggravating factors include: standing, walking, climbing stairs and laying down at night.     Therapies tried and outcome: rest/inactivity, aleve, ibuprofen, mobic - minimally effective.           Problem list and histories reviewed & adjusted, as indicated.  Additional history: as documented    Patient Active Problem List   Diagnosis     Migraine headache     Menopausal syndrome (hot flashes)     Advance Care Planning     Health Care Home     Colonoscopy refused     Senile nuclear sclerosis     Hyperlipidemia LDL goal <130     Past Surgical History:   Procedure Laterality Date     GYN SURGERY  1979    total hyst- benign     HYSTERECTOMY, PAP NO LONGER INDICATED  1979    TAHBSO-endometriosis     ORTHOPEDIC SURGERY  1999    right ankle fracture     PHACOEMULSIFICATION WITH STANDARD INTRAOCULAR LENS IMPLANT Right 5/28/2015    Procedure: PHACOEMULSIFICATION WITH STANDARD INTRAOCULAR LENS IMPLANT;  Surgeon: Laz Camara MD;  Location: [facility]     PHACOEMULSIFICATION WITH STANDARD INTRAOCULAR LENS IMPLANT Left 6/18/2015    Procedure: PHACOEMULSIFICATION WITH STANDARD INTRAOCULAR LENS IMPLANT;  Surgeon: Laz Camara MD;  Location: [facility]     REPAIR HAMMER TOE  1/7/2014    Procedure: REPAIR HAMMER TOE;  bilateral 2nd & 3rd hammer toe repair;  Surgeon: Sb  "Isauro TELLO MD;  Location: [facility] OR     SURGICAL HISTORY OF -       ORIF right ankle     SURGICAL HISTORY OF -       bunions both feet       Social History   Substance Use Topics     Smoking status: Former Smoker     Packs/day: 0.50     Years: 15.00     Types: Cigarettes     Quit date: 12/12/1987     Smokeless tobacco: Never Used      Comment: quit 20 years ago.  Smoked off and on for 20 years     Alcohol use No     Family History   Problem Relation Age of Onset     C.A.D. Father      Cardiovascular Father          Current Outpatient Prescriptions   Medication Sig Dispense Refill     ALEVE 220 MG OR CAPS 1 CAPSULE EVERY 12 HOURS AS NEEDED       BL FLAX SEED OIL 1000 MG OR CAPS 2 tablets daily       butalbital-acetaminophen-caffeine (FIORICET/ESGIC) -40 MG per tablet Take 1-2 tablets by mouth every 4 hours as needed for pain 100 tablet 2     GRAPE SEED OR 300mg daily       lidocaine 1 % injection 3 mLs by INTRA-ARTICULAR route once for 1 dose 3 mL 0     OTHER MEDICAL SUPPLIES Multivitamin packet including calcium and Vit D.       prochlorperazine (COMPAZINE) 5 MG tablet Take 1 tablet (5 mg) by mouth 3 times daily as needed for nausea. 30 tablet 2     simvastatin (ZOCOR) 20 MG tablet Take 1 tablet (20 mg) by mouth At Bedtime 90 tablet 3     meloxicam (MOBIC) 7.5 MG tablet Take 1 tablet (7.5 mg) by mouth daily (Patient not taking: Reported on 9/27/2018) 21 tablet 1     No Known Allergies    Reviewed and updated as needed this visit by clinical staff       Reviewed and updated as needed this visit by Provider         ROS:  CONSTITUTIONAL: NEGATIVE for fever, chills, change in weight  ENT/MOUTH: NEGATIVE for ear, mouth and throat problems  RESP: NEGATIVE for significant cough or SOB  CV: NEGATIVE for chest pain, palpitations or peripheral edema    OBJECTIVE:     /88 (BP Location: Right arm, Patient Position: Sitting, Cuff Size: Adult Regular)  Pulse 70  Temp 98.9  F (37.2  C) (Tympanic)  Ht 5' 4\" (1.626 m)  Wt " 186 lb (84.4 kg)  SpO2 98%  Breastfeeding? No  BMI 31.93 kg/m2  Body mass index is 31.93 kg/(m^2).  GENERAL: healthy, alert and no distress  NECK: no adenopathy, no asymmetry, masses, or scars and thyroid normal to palpation  RESP: lungs clear to auscultation - no rales, rhonchi or wheezes  CV: regular rate and rhythm, normal S1 S2, no S3 or S4, no murmur, click or rub, no peripheral edema and peripheral pulses strong  ABDOMEN: soft, nontender, no hepatosplenomegaly, no masses and bowel sounds normal  MS: no gross musculoskeletal defects noted, no edema  Procedure.  She does have distinct tenderness over her  trochanteric bursa..  We prepped this in the usual fashion and injected with 40 mg of Kenalog and 3 cc of Xylocaine.      ASSESSMENT/PLAN:             1. Trochanteric bursitis of left hip  Trochanteric bursitis injected as noted below  - Kenalog 40 MG  []  - Large Joint/Bursa injection and/or drainage (Shoulder, Knee)  - lidocaine 1 % injection; 3 mLs by INTRA-ARTICULAR route once for 1 dose  Dispense: 3 mL; Refill: 0    ASSESSMENT/PLAN:      ICD-10-CM    1. Trochanteric bursitis of left hip M70.62 Kenalog 40 MG  []     Large Joint/Bursa injection and/or drainage (Shoulder, Knee)     lidocaine 1 % injection       Patient Instructions   1. This sure does appear to be bursitis.     2. The injection usually works well    3. If not better in three weeks come back    4. Stop the mobic.           Ricci Laird MD  [facility]

## 2018-09-27 NOTE — PATIENT INSTRUCTIONS
1. This sure does appear to be bursitis.     2. The injection usually works well    3. If not better in three weeks come back    4. Stop the mobic.

## 2018-10-18 ENCOUNTER — OFFICE VISIT (OUTPATIENT)
Dept: FAMILY MEDICINE | Facility: CLINIC | Age: 78
End: 2018-10-18
Payer: COMMERCIAL

## 2018-10-18 VITALS — SYSTOLIC BLOOD PRESSURE: 122 MMHG | DIASTOLIC BLOOD PRESSURE: 84 MMHG | TEMPERATURE: 98.3 F | HEART RATE: 74 BPM

## 2018-10-18 DIAGNOSIS — M70.62 TROCHANTERIC BURSITIS OF LEFT HIP: Primary | ICD-10-CM

## 2018-10-18 PROCEDURE — 20610 DRAIN/INJ JOINT/BURSA W/O US: CPT | Mod: LT | Performed by: FAMILY MEDICINE

## 2018-10-18 NOTE — PROGRESS NOTES
SUBJECTIVE:   Susana Duenas is a 78 year old female who presents to clinic today for the following health issues:       Musculoskeletal problem/pain  She is better but still with some pain over the left trochanter.        Duration: Ongoing, 3 week follow up    Description  Location: left hip    Intensity:  Mild-moderate    Accompanying signs and symptoms: radiation of pain down left upper leg    History  Previous similar problem: YES  Previous evaluation:  x-ray    Precipitating or alleviating factors:  Trauma or overuse: no   Aggravating factors include: sitting, standing and walking    Therapies tried and outcome: rest/inactivity, injection,           Problem list and histories reviewed & adjusted, as indicated.  Additional history: as documented    Patient Active Problem List   Diagnosis     Migraine headache     Menopausal syndrome (hot flashes)     Advance Care Planning     Health Care Home     Colonoscopy refused     Senile nuclear sclerosis     Hyperlipidemia LDL goal <130     Past Surgical History:   Procedure Laterality Date     GYN SURGERY  1979    total hyst- benign     HYSTERECTOMY, PAP NO LONGER INDICATED  1979    TAHBSO-endometriosis     ORTHOPEDIC SURGERY  1999    right ankle fracture     PHACOEMULSIFICATION WITH STANDARD INTRAOCULAR LENS IMPLANT Right 5/28/2015    Procedure: PHACOEMULSIFICATION WITH STANDARD INTRAOCULAR LENS IMPLANT;  Surgeon: Laz Camara MD;  Location: [facility]     PHACOEMULSIFICATION WITH STANDARD INTRAOCULAR LENS IMPLANT Left 6/18/2015    Procedure: PHACOEMULSIFICATION WITH STANDARD INTRAOCULAR LENS IMPLANT;  Surgeon: Laz Camara MD;  Location: [facility]     REPAIR HAMMER TOE  1/7/2014    Procedure: REPAIR HAMMER TOE;  bilateral 2nd & 3rd hammer toe repair;  Surgeon: Isauro Basurto MD;  Location:  OR     SURGICAL HISTORY OF -       ORIF right ankle     SURGICAL HISTORY OF -       bunions both feet       Social History   Substance Use Topics     Smoking status:  Former Smoker     Packs/day: 0.50     Years: 15.00     Types: Cigarettes     Quit date: 12/12/1987     Smokeless tobacco: Never Used      Comment: quit 20 years ago.  Smoked off and on for 20 years     Alcohol use No     Family History   Problem Relation Age of Onset     C.A.D. Father      Cardiovascular Father          Current Outpatient Prescriptions   Medication Sig Dispense Refill     ALEVE 220 MG OR CAPS 1 CAPSULE EVERY 12 HOURS AS NEEDED       BL FLAX SEED OIL 1000 MG OR CAPS 2 tablets daily       butalbital-acetaminophen-caffeine (FIORICET/ESGIC) -40 MG per tablet Take 1-2 tablets by mouth every 4 hours as needed for pain 100 tablet 2     GRAPE SEED OR 300mg daily       OTHER MEDICAL SUPPLIES Multivitamin packet including calcium and Vit D.       prochlorperazine (COMPAZINE) 5 MG tablet Take 1 tablet (5 mg) by mouth 3 times daily as needed for nausea. 30 tablet 2     simvastatin (ZOCOR) 20 MG tablet Take 1 tablet (20 mg) by mouth At Bedtime 90 tablet 3     lidocaine 1 % injection 3 mLs by INTRA-ARTICULAR route once for 1 dose 3 mL 0     meloxicam (MOBIC) 7.5 MG tablet Take 1 tablet (7.5 mg) by mouth daily (Patient not taking: Reported on 9/27/2018) 21 tablet 1     No Known Allergies    Reviewed and updated as needed this visit by clinical staff       Reviewed and updated as needed this visit by Provider         ROS:  CONSTITUTIONAL: NEGATIVE for fever, chills, change in weight  ENT/MOUTH: NEGATIVE for ear, mouth and throat problems  RESP: NEGATIVE for significant cough or SOB  CV: NEGATIVE for chest pain, palpitations or peripheral edema    OBJECTIVE:     /84  Pulse 74  Temp 98.3  F (36.8  C) (Tympanic)  Breastfeeding? No  There is no height or weight on file to calculate BMI.  GENERAL: healthy, alert and no distress  NECK: no adenopathy, no asymmetry, masses, or scars and thyroid normal to palpation  MS: localized tenderness over left lat trochanter  PROCEDURE.   40 MG KENALOG AND 3 CC  XYLOCAINE INTO THE TROCHANTERIC BURSA AREA.        ASSESSMENT/PLAN:     ASSESSMENT/PLAN:      ICD-10-CM    1. Trochanteric bursitis of left hip M70.62        Patient Instructions   1. This sure looks like muscle tendon pain.    2. Hip and top of femur look normal    3. The localization of the pain favors tendonitis.    4. Lets do a 2nd injection and if not better consider xray of entire femur and possibe orth consult.                       Ricci Laird MD  [facility]

## 2018-10-18 NOTE — PATIENT INSTRUCTIONS
1. This sure looks like muscle tendon pain.    2. Hip and top of femur look normal    3. The localization of the pain favors tendonitis.    4. Lets do a 2nd injection and if not better consider xray of entire femur and possibe orth consult.

## 2018-10-18 NOTE — MR AVS SNAPSHOT
After Visit Summary   10/18/2018    Susana Duenas    MRN: [id number]           Patient Information     Date Of Birth          1940        Visit Information        Provider Department      10/18/2018 9:20 AM Ricci Laird MD [facility]        Care Instructions    1. This sure looks like muscle tendon pain.    2. Hip and top of femur look normal    3. The localization of the pain favors tendonitis.    4. Lets do a 2nd injection and if not better consider xray of entire femur and possibe orth consult.           Follow-ups after your visit        Who to contact     If you have questions or need follow up information about today's clinic visit or your schedule please contact [facility] directly at [phone].  Normal or non-critical lab and imaging results will be communicated to you by MyChart, letter or phone within 4 business days after the clinic has received the results. If you do not hear from us within 7 days, please contact the clinic through [employer] or phone. If you have a critical or abnormal lab result, we will notify you by phone as soon as possible.  Submit refill requests through [employer] or call your pharmacy and they will forward the refill request to us. Please allow 3 business days for your refill to be completed.          Additional Information About Your Visit        MyChart Information     [employer] gives you secure access to your electronic health record. If you see a primary care provider, you can also send messages to your care team and make appointments. If you have questions, please call your primary care clinic.  If you do not have a primary care provider, please call [phone] and they will assist you.        Care EveryWhere ID     This is your Care EveryWhere ID. This could be used by other organizations to access your [place] medical records  [id number]        Your Vitals Were     Pulse Temperature Breastfeeding?              74 98.3  F (36.8  C) (Tympanic) No          Blood Pressure from Last 3 Encounters:   10/18/18 122/84   09/27/18 162/88   09/11/18 134/70    Weight from Last 3 Encounters:   09/27/18 186 lb (84.4 kg)   09/11/18 186 lb (84.4 kg)   06/28/18 187 lb (84.8 kg)              Today, you had the following     No orders found for display       Primary Care Provider Office Phone # Fax #    Heena Ny [phone] [address] [place] [address]        Equal Access to Services     [facility] : Hadii laura Dangelo, waezekiel rico, qaybta kaalmada rosa, perlita alberts . So [facility] [phone].    ATENCIÓN: Si habla español, tiene a sanches disposición servicios gratuitos de asistencia lingüística. [facility] [phone].    We comply with applicable federal civil rights laws and [place] laws. We do not discriminate on the basis of race, color, national origin, age, disability, sex, sexual orientation, or gender identity.            Thank you!     Thank you for choosing [facility]  for your care. Our goal is always to provide you with excellent care. Hearing back from our patients is one way we can continue to improve our services. Please take a few minutes to complete the written survey that you may receive in the mail after your visit with us. Thank you!             Your Updated Medication List - Protect others around you: Learn how to safely use, store and throw away your medicines at www.disposemymeds.org.          This list is accurate as of 10/18/18  9:47 AM.  Always use your most recent med list.                   Brand Name Dispense Instructions for use Diagnosis    ALEVE 220 MG capsule   Generic drug:  naproxen sodium      1 CAPSULE EVERY 12 HOURS AS NEEDED        BL FLAX SEED OIL 1000 MG Caps      2 tablets daily        butalbital-acetaminophen-caffeine -40 MG per tablet    FIORICET/ESGIC    100 tablet    Take  1-2 tablets by mouth every 4 hours as needed for pain    Intractable chronic migraine without aura and without status migrainosus       GRAPE SEED PO      300mg daily        lidocaine 1 % injection     3 mL    3 mLs by INTRA-ARTICULAR route once for 1 dose    Trochanteric bursitis of left hip       meloxicam 7.5 MG tablet    MOBIC    21 tablet    Take 1 tablet (7.5 mg) by mouth daily    Hip pain, left       other medical supplies      Multivitamin packet including calcium and Vit D.    Migraine headache       prochlorperazine 5 MG tablet    COMPAZINE    30 tablet    Take 1 tablet (5 mg) by mouth 3 times daily as needed for nausea.    Intractable chronic migraine without aura and without status migrainosus       simvastatin 20 MG tablet    ZOCOR    90 tablet    Take 1 tablet (20 mg) by mouth At Bedtime    Hyperlipidemia LDL goal <130

## 2018-10-18 NOTE — NURSING NOTE
"Chief Complaint   Patient presents with     Musculoskeletal Problem     left hip recheck       Initial /84  Pulse 74  Temp 98.3  F (36.8  C) (Tympanic)  Breastfeeding? No Estimated body mass index is 31.93 kg/(m^2) as calculated from the following:    Height as of 9/27/18: 5' 4\" (1.626 m).    Weight as of 9/27/18: 186 lb (84.4 kg).    Patient presents to the clinic using No DME    Health Maintenance that is potentially due pending provider review:  Colonoscopy/FIT    Possibly completing today per provider review.    Is there anyone who you would like to be able to receive your results? No  If yes have patient fill out [place]    "

## 2018-11-20 ENCOUNTER — OFFICE VISIT (OUTPATIENT)
Dept: FAMILY MEDICINE | Facility: CLINIC | Age: 78
End: 2018-11-20
Payer: COMMERCIAL

## 2018-11-20 VITALS
HEIGHT: 64 IN | WEIGHT: 183.4 LBS | OXYGEN SATURATION: 99 % | BODY MASS INDEX: 31.31 KG/M2 | HEART RATE: 77 BPM | DIASTOLIC BLOOD PRESSURE: 88 MMHG | TEMPERATURE: 98.3 F | SYSTOLIC BLOOD PRESSURE: 122 MMHG

## 2018-11-20 DIAGNOSIS — E78.5 HYPERLIPIDEMIA LDL GOAL <130: ICD-10-CM

## 2018-11-20 DIAGNOSIS — G43.719 INTRACTABLE CHRONIC MIGRAINE WITHOUT AURA AND WITHOUT STATUS MIGRAINOSUS: ICD-10-CM

## 2018-11-20 LAB
ANION GAP SERPL CALCULATED.3IONS-SCNC: 5 MMOL/L (ref 3–14)
BUN SERPL-MCNC: 24 MG/DL (ref 7–30)
CALCIUM SERPL-MCNC: 9.4 MG/DL (ref 8.5–10.1)
CHLORIDE SERPL-SCNC: 108 MMOL/L (ref 94–109)
CHOLEST SERPL-MCNC: 155 MG/DL
CO2 SERPL-SCNC: 30 MMOL/L (ref 20–32)
CREAT SERPL-MCNC: 0.92 MG/DL (ref 0.52–1.04)
GFR SERPL CREATININE-BSD FRML MDRD: 59 ML/MIN/1.7M2
GLUCOSE SERPL-MCNC: 84 MG/DL (ref 70–99)
HDLC SERPL-MCNC: 73 MG/DL
LDLC SERPL CALC-MCNC: 70 MG/DL
NONHDLC SERPL-MCNC: 82 MG/DL
POTASSIUM SERPL-SCNC: 4.6 MMOL/L (ref 3.4–5.3)
SODIUM SERPL-SCNC: 143 MMOL/L (ref 133–144)
TRIGL SERPL-MCNC: 59 MG/DL
TSH SERPL DL<=0.005 MIU/L-ACNC: 2.35 MU/L (ref 0.4–4)

## 2018-11-20 PROCEDURE — 80061 LIPID PANEL: CPT | Performed by: FAMILY MEDICINE

## 2018-11-20 PROCEDURE — 36415 COLL VENOUS BLD VENIPUNCTURE: CPT | Performed by: FAMILY MEDICINE

## 2018-11-20 PROCEDURE — 99214 OFFICE O/P EST MOD 30 MIN: CPT | Performed by: FAMILY MEDICINE

## 2018-11-20 PROCEDURE — 84443 ASSAY THYROID STIM HORMONE: CPT | Performed by: FAMILY MEDICINE

## 2018-11-20 PROCEDURE — 80048 BASIC METABOLIC PNL TOTAL CA: CPT | Performed by: FAMILY MEDICINE

## 2018-11-20 RX ORDER — BUTALBITAL, ACETAMINOPHEN AND CAFFEINE 50; 325; 40 MG/1; MG/1; MG/1
1-2 TABLET ORAL EVERY 4 HOURS PRN
Qty: 100 TABLET | Refills: 2 | Status: SHIPPED | OUTPATIENT
Start: 2018-11-20 | End: 2019-12-16

## 2018-11-20 RX ORDER — PROCHLORPERAZINE MALEATE 5 MG
5 TABLET ORAL 3 TIMES DAILY PRN
Qty: 30 TABLET | Refills: 2 | Status: SHIPPED | OUTPATIENT
Start: 2018-11-20 | End: 2019-12-16

## 2018-11-20 RX ORDER — SIMVASTATIN 20 MG
20 TABLET ORAL AT BEDTIME
Qty: 90 TABLET | Refills: 3 | Status: SHIPPED | OUTPATIENT
Start: 2018-11-20 | End: 2019-12-16

## 2018-11-20 NOTE — PATIENT INSTRUCTIONS
Stay on the same medications     Labs today     Flu shot today     Recheck in one year sooner if concerns

## 2018-11-20 NOTE — MR AVS SNAPSHOT
After Visit Summary   11/20/2018    Susana Duenas    MRN: [id number]           Patient Information     Date Of Birth          1940        Visit Information        Provider Department      11/20/2018 8:40 AM Heena Ny MD [facility]        Today's Diagnoses     Hyperlipidemia LDL goal <130        Intractable chronic migraine without aura and without status migrainosus          Care Instructions    Stay on the same medications     Labs today     Flu shot today     Recheck in one year sooner if concerns              Follow-ups after your visit        Follow-up notes from your care team     Return in about 1 year (around 11/20/2019) for Routine Visit.      Who to contact     If you have questions or need follow up information about today's clinic visit or your schedule please contact [facility] directly at [phone].  Normal or non-critical lab and imaging results will be communicated to you by MyChart, letter or phone within 4 business days after the clinic has received the results. If you do not hear from us within 7 days, please contact the clinic through MyChart or phone. If you have a critical or abnormal lab result, we will notify you by phone as soon as possible.  Submit refill requests through [employer] or call your pharmacy and they will forward the refill request to us. Please allow 3 business days for your refill to be completed.          Additional Information About Your Visit        MyChart Information     [employer] gives you secure access to your electronic health record. If you see a primary care provider, you can also send messages to your care team and make appointments. If you have questions, please call your primary care clinic.  If you do not have a primary care provider, please call [phone] and they will assist you.        Care EveryWhere ID     This is your Care EveryWhere ID. This could be used by other organizations to  "access your [place] medical records  [id number]        Your Vitals Were     Pulse Temperature Height Pulse Oximetry BMI (Body Mass Index)       77 98.3  F (36.8  C) (Tympanic) 5' 4\" (1.626 m) 99% 31.48 kg/m2        Blood Pressure from Last 3 Encounters:   11/20/18 122/88   10/18/18 122/84   09/27/18 162/88    Weight from Last 3 Encounters:   11/20/18 183 lb 6.4 oz (83.2 kg)   09/27/18 186 lb (84.4 kg)   09/11/18 186 lb (84.4 kg)              We Performed the Following     Basic metabolic panel     Lipid panel reflex to direct LDL Fasting     TSH with free T4 reflex          Today's Medication Changes          These changes are accurate as of 11/20/18  8:59 AM.  If you have any questions, ask your nurse or doctor.               These medicines have changed or have updated prescriptions.        Dose/Directions    prochlorperazine 5 MG tablet   Commonly known as:  COMPAZINE   This may have changed:  reasons to take this   Used for:  Intractable chronic migraine without aura and without status migrainosus   Changed by:  Heena Ny MD        Dose:  5 mg   Take 1 tablet (5 mg) by mouth 3 times daily as needed for nausea (with migraine) for nausea.   Quantity:  30 tablet   Refills:  2            Where to get your medicines      These medications were sent to [facility] [address] [place] [address]  [address] [place] [address]     Phone:  [phone]     prochlorperazine 5 MG tablet    simvastatin 20 MG tablet         Some of these will need a paper prescription and others can be bought over the counter.  Ask your nurse if you have questions.     Bring a paper prescription for each of these medications     butalbital-acetaminophen-caffeine -40 MG per tablet                Primary Care Provider Office Phone # Fax #    Heena Ny [phone] [address] [place] [address]        Equal Access to Services     ÁNGELA SNYDER AH: Hadii " laura Dangelo, wastacida luqadaha, qaybta kaalmada rosa, waxbrenda juan zeereneaallen alberts hay. So [facility] [phone].    ATENCIÓN: Si habla leonor, tiene a sanches disposición servicios gratuitos de asistencia lingüística. Gabriella al [phone].    We comply with applicable federal civil rights laws and [place] laws. We do not discriminate on the basis of race, color, national origin, age, disability, sex, sexual orientation, or gender identity.            Thank you!     Thank you for choosing [facility]  for your care. Our goal is always to provide you with excellent care. Hearing back from our patients is one way we can continue to improve our services. Please take a few minutes to complete the written survey that you may receive in the mail after your visit with us. Thank you!             Your Updated Medication List - Protect others around you: Learn how to safely use, store and throw away your medicines at www.disposemymeds.org.          This list is accurate as of 11/20/18  8:59 AM.  Always use your most recent med list.                   Brand Name Dispense Instructions for use Diagnosis    ALEVE 220 MG capsule   Generic drug:  naproxen sodium      1 CAPSULE EVERY 12 HOURS AS NEEDED        BL FLAX SEED OIL 1000 MG Caps      2 tablets daily        butalbital-acetaminophen-caffeine -40 MG per tablet    FIORICET/ESGIC    100 tablet    Take 1-2 tablets by mouth every 4 hours as needed for pain    Intractable chronic migraine without aura and without status migrainosus       GRAPE SEED PO      300mg daily        other medical supplies      Multivitamin packet including calcium and Vit D.    Migraine headache       prochlorperazine 5 MG tablet    COMPAZINE    30 tablet    Take 1 tablet (5 mg) by mouth 3 times daily as needed for nausea (with migraine) for nausea.    Intractable chronic migraine without aura and without status migrainosus       simvastatin 20 MG tablet    ZOCOR     90 tablet    Take 1 tablet (20 mg) by mouth At Bedtime    Hyperlipidemia LDL goal <130

## 2018-12-14 ENCOUNTER — HOSPITAL ENCOUNTER (OUTPATIENT)
Dept: OUTPATIENT PROCEDURES | Facility: CLINIC | Age: 78
Discharge: HOME | End: 2018-12-14
Attending: OPHTHALMOLOGY | Admitting: OPHTHALMOLOGY
Payer: COMMERCIAL

## 2018-12-14 PROCEDURE — 66821 AFTER CATARACT LASER SURGERY: CPT | Mod: 50 | Performed by: OPHTHALMOLOGY

## 2019-03-14 ENCOUNTER — TELEPHONE (OUTPATIENT)
Dept: FAMILY MEDICINE | Facility: CLINIC | Age: 79
End: 2019-03-14

## 2019-03-14 DIAGNOSIS — M79.605 PAIN OF LEFT LOWER EXTREMITY: Primary | ICD-10-CM

## 2019-03-14 NOTE — TELEPHONE ENCOUNTER
Reason for call:  Patient reporting a symptom    Symptom or request: Pt continues to have Pain Left Leg- Between Hip and Knee. Pt has 2 injections 10/18/19. They helped in the beginning but goes back to the pain level. Last dictation said further X-rays or Ortho consult. Please Advise    Have you been treated for this before? Yes    Phone Number patient can be reached at:  Home number on file [phone] (home)    Best Time:  Any Time      Can we leave a detailed message on this number:  YES    Call taken on 3/14/2019 at 11:10 AM by Priya Cardoso

## 2019-03-14 NOTE — TELEPHONE ENCOUNTER
Left message, Dr. Ny out of office until next week. Hip pain has been ongoing for many months, will call back after Dr Ny reviews chart. Note from Dr. Laird in October:    Patient Instructions   1. This sure looks like muscle tendon pain.     2. Hip and top of femur look normal     3. The localization of the pain favors tendonitis.     4. Lets do a 2nd injection and if not better consider xray of entire femur and possibe orth consult.

## 2019-03-27 ENCOUNTER — OFFICE VISIT (OUTPATIENT)
Dept: ORTHOPEDICS | Facility: CLINIC | Age: 79
End: 2019-03-27
Payer: COMMERCIAL

## 2019-03-27 VITALS
SYSTOLIC BLOOD PRESSURE: 126 MMHG | BODY MASS INDEX: 31.41 KG/M2 | WEIGHT: 184 LBS | DIASTOLIC BLOOD PRESSURE: 82 MMHG | HEIGHT: 64 IN

## 2019-03-27 DIAGNOSIS — M76.32 IT BAND SYNDROME, LEFT: Primary | ICD-10-CM

## 2019-03-27 DIAGNOSIS — M25.559 GREATER TROCHANTERIC PAIN SYNDROME: ICD-10-CM

## 2019-03-27 PROCEDURE — 99214 OFFICE O/P EST MOD 30 MIN: CPT | Performed by: PEDIATRICS

## 2019-03-27 ASSESSMENT — MIFFLIN-ST. JEOR: SCORE: 1309.62

## 2019-03-27 NOTE — PROGRESS NOTES
Sports Medicine Clinic Visit    PCP: Heena Nyavis Duenas is a 78 year old female who is seen  in consultation at the request of  Heena Ny M.D. presenting with left hip and leg pain    Injury: She reports lateral hip pain for 1-2 years . She reports she has had 2 steroid injections in her greater trochanter. She reports her last injection was in October of 2018. She reports mild relief in hip pain following the injection. She reports pain with walking and stairs and laying on the left hip. The pain does radiate to her lateral thigh to her knee. Mild back pain, but feels different.  No numbness or tingling.    Location of Pain: left lateral hip  Duration of Pain: 1-2 year(s)  Rating of Pain at worst: 7/10  Rating of Pain Currently: 3/10  Symptoms are better with: rest  Symptoms are worse with: sitting, stairs and walking  Additional Features:   Positive: weakness   Negative: swelling, bruising, popping, grinding, catching, locking, instability, paresthesias and numbness  Other evaluation and/or treatments so far consists of: Ibuprofen and Rest  Prior History of related problems: Trochanteric Bursitis    Social History: retired    Review of Systems  Skin: no bruising, no swelling  Musculoskeletal: as above  Neurologic: no numbness, paresthesias  Remainder of review of systems is negative including constitutional, CV, pulmonary, GI, except as noted in HPI or medical history.    Patient's current problem list, past medical and surgical history, and family history were reviewed.    Patient Active Problem List   Diagnosis     Migraine headache     Menopausal syndrome (hot flashes)     Advance Care Planning     Health Care Home     Colonoscopy refused     Senile nuclear sclerosis     Hyperlipidemia LDL goal <130     Past Medical History:   Diagnosis Date     Arthritis      Endometriosis     YOLETTE done     Hypercholesteremia      Migraine      Past Surgical History:   Procedure Laterality Date  "    GYN SURGERY  1979    total hyst- benign     HYSTERECTOMY, PAP NO LONGER INDICATED  1979    TAHBSO-endometriosis     ORTHOPEDIC SURGERY  1999    right ankle fracture     PHACOEMULSIFICATION WITH STANDARD INTRAOCULAR LENS IMPLANT Right 5/28/2015    Procedure: PHACOEMULSIFICATION WITH STANDARD INTRAOCULAR LENS IMPLANT;  Surgeon: Laz Camara MD;  Location: [facility]     PHACOEMULSIFICATION WITH STANDARD INTRAOCULAR LENS IMPLANT Left 6/18/2015    Procedure: PHACOEMULSIFICATION WITH STANDARD INTRAOCULAR LENS IMPLANT;  Surgeon: Laz Camara MD;  Location: [facility]     REPAIR HAMMER TOE  1/7/2014    Procedure: REPAIR HAMMER TOE;  bilateral 2nd & 3rd hammer toe repair;  Surgeon: Isauro Basurto MD;  Location: [facility] OR     SURGICAL HISTORY OF -       ORIF right ankle     SURGICAL HISTORY OF -       bunions both feet     Family History   Problem Relation Age of Onset     C.A.D. Father      Cardiovascular Father        Objective  /82 (BP Location: Left arm, Patient Position: Chair, Cuff Size: Adult Regular)   Ht 1.626 m (5' 4\")   Wt 83.5 kg (184 lb)   BMI 31.58 kg/m      GENERAL APPEARANCE: healthy, alert and no distress   GAIT: NORMAL  SKIN: no suspicious lesions or rashes  HEENT: Sclera clear, anicteric  CV: good peripheral pulses  RESP: Breathing not labored  NEURO: Normal strength and tone, mentation intact and speech normal  PSYCH:  mentation appears normal and affect normal/bright    Low back exam:  Inspection:     no visible deformity in the low back       normal skin       normal vascular       normal lymphatic    Posture:      normal    Foot Inspection:     no deformity noted    Tender:     none    Non Tender:     remainder of lumbar spine    ROM:      limited flexion due to pain       limited extension due to pain    Strength:     hip flexion 5/5 bilateral       knee extension 5/5 bilateral       ankle dorsiflexion 5/5 bilateral       ankle plantarflexion 5/5 bilateral       dorsiflexion of the " great toe 5/5 bilateral       able to heel and toe walk    Reflexes:     patellar (L3, L4) symmetric normal       achilles tendons (S1) symmetric normal    Sensation:    grossly intact throughout lower extremities    Special tests:      straight leg raise left negative        straight leg raise right negative    Bilateral hip exam  Inspection:      no edema or ecchymosis in hip area    Tender:      greater trochanter bilateral (L> R)    Non Tender:      remainder of the hip area bilateral    ROM:     Full active and passive ROM  bilateral    Strength:      flexion 5/5 bilateral       abduction 5/5 bilateral       adduction 5/5 bilateral    Sensation:      grossly intact in hip and thigh    Special Tests:      positive (+) Laetsha left    Radiology  I visualized and reviewed these images with the patient  PELVIS AND HIP BILATERAL TWO VIEWS September 11, 2018 2:09 PM   HISTORY: Hip pain, left.  COMPARISON: None.                                                          IMPRESSION: No evidence of fracture or AVN. Hip joint spaces appear  well preserved. Unremarkable.    Assessment:  1. It band syndrome, left    2. Greater trochanteric pain syndrome      Discussed the diagnosis of greater trochanteric pain syndrome and contributing factors to lateral hip pain including gluteal tendinopathy, IT Band Syndrome and weak hip abductor muscles.  Discussed that these factors can cause inflammation in the greater trochanteric bursa.  I recommend physical therapy for overall hip strengthening.  Discussed that injections are sometimes helpful for point tenderness over the bursa, however, will not improve overall hip strength.  Would consider further work up and treatment pending clinical course.  - lumbar etiology less likely given current exam    Plan:  - Today's Plan of Care:  [phone]    -We also discussed other future treatment options:  MRI of the left hip    Follow Up: 6 - 8  weeks    Concerning signs and symptoms were reviewed.  The patient expressed understanding of this management plan and all questions were answered at this time.    Shante Moreno MD CAQ  Primary Care Sports Medicine  [facility]

## 2019-03-27 NOTE — LETTER
3/27/2019         RE: Susana Duenas  [address] [facility] [address]        Dear Colleague,    Thank you for referring your patient, Susana Duenas, to the [facility]. Please see a copy of my visit note below.    Sports Medicine Clinic Visit    PCP: Heena Ny    Susana Duenas is a 78 year old female who is seen  in consultation at the request of  Heena Ny M.D. presenting with left hip and leg pain    Injury: She reports lateral hip pain for 1-2 years . She reports she has had 2 steroid injections in her greater trochanter. She reports her last injection was in October of 2018. She reports mild relief in hip pain following the injection. She reports pain with walking and stairs and laying on the left hip. The pain does radiate to her lateral thigh to her knee. Mild back pain, but feels different.  No numbness or tingling.    Location of Pain: left lateral hip  Duration of Pain: 1-2 year(s)  Rating of Pain at worst: 7/10  Rating of Pain Currently: 3/10  Symptoms are better with: rest  Symptoms are worse with: sitting, stairs and walking  Additional Features:   Positive: weakness   Negative: swelling, bruising, popping, grinding, catching, locking, instability, paresthesias and numbness  Other evaluation and/or treatments so far consists of: Ibuprofen and Rest  Prior History of related problems: Trochanteric Bursitis    Social History: retired    Review of Systems  Skin: no bruising, no swelling  Musculoskeletal: as above  Neurologic: no numbness, paresthesias  Remainder of review of systems is negative including constitutional, CV, pulmonary, GI, except as noted in HPI or medical history.    Patient's current problem list, past medical and surgical history, and family history were reviewed.    Patient Active Problem List   Diagnosis     Migraine headache     Menopausal syndrome (hot flashes)     Advance Care Planning     Health Care Home  "    Colonoscopy refused     Senile nuclear sclerosis     Hyperlipidemia LDL goal <130     Past Medical History:   Diagnosis Date     Arthritis      Endometriosis     YOLETTE done     Hypercholesteremia      Migraine      Past Surgical History:   Procedure Laterality Date     GYN SURGERY  1979    total hyst- benign     HYSTERECTOMY, PAP NO LONGER INDICATED  1979    TAHBSO-endometriosis     ORTHOPEDIC SURGERY  1999    right ankle fracture     PHACOEMULSIFICATION WITH STANDARD INTRAOCULAR LENS IMPLANT Right 5/28/2015    Procedure: PHACOEMULSIFICATION WITH STANDARD INTRAOCULAR LENS IMPLANT;  Surgeon: Laz Camara MD;  Location: [facility]     PHACOEMULSIFICATION WITH STANDARD INTRAOCULAR LENS IMPLANT Left 6/18/2015    Procedure: PHACOEMULSIFICATION WITH STANDARD INTRAOCULAR LENS IMPLANT;  Surgeon: Laz Camara MD;  Location: [facility]     REPAIR HAMMER TOE  1/7/2014    Procedure: REPAIR HAMMER TOE;  bilateral 2nd & 3rd hammer toe repair;  Surgeon: Isauro Basurto MD;  Location: [facility] OR     SURGICAL HISTORY OF -       ORIF right ankle     SURGICAL HISTORY OF -       bunions both feet     Family History   Problem Relation Age of Onset     C.A.D. Father      Cardiovascular Father        Objective  /82 (BP Location: Left arm, Patient Position: Chair, Cuff Size: Adult Regular)   Ht 1.626 m (5' 4\")   Wt 83.5 kg (184 lb)   BMI 31.58 kg/m       GENERAL APPEARANCE: healthy, alert and no distress   GAIT: NORMAL  SKIN: no suspicious lesions or rashes  HEENT: Sclera clear, anicteric  CV: good peripheral pulses  RESP: Breathing not labored  NEURO: Normal strength and tone, mentation intact and speech normal  PSYCH:  mentation appears normal and affect normal/bright    Low back exam:  Inspection:     no visible deformity in the low back       normal skin       normal vascular       normal lymphatic    Posture:      normal    Foot Inspection:     no deformity noted    Tender:     none    Non Tender:     remainder of lumbar " spine    ROM:      limited flexion due to pain       limited extension due to pain    Strength:     hip flexion 5/5 bilateral       knee extension 5/5 bilateral       ankle dorsiflexion 5/5 bilateral       ankle plantarflexion 5/5 bilateral       dorsiflexion of the great toe 5/5 bilateral       able to heel and toe walk    Reflexes:     patellar (L3, L4) symmetric normal       achilles tendons (S1) symmetric normal    Sensation:    grossly intact throughout lower extremities    Special tests:      straight leg raise left negative        straight leg raise right negative    Bilateral hip exam  Inspection:      no edema or ecchymosis in hip area    Tender:      greater trochanter bilateral (L> R)    Non Tender:      remainder of the hip area bilateral    ROM:     Full active and passive ROM  bilateral    Strength:      flexion 5/5 bilateral       abduction 5/5 bilateral       adduction 5/5 bilateral    Sensation:      grossly intact in hip and thigh    Special Tests:      positive (+) Latesha left    Radiology  I visualized and reviewed these images with the patient  PELVIS AND HIP BILATERAL TWO VIEWS September 11, 2018 2:09 PM   HISTORY: Hip pain, left.  COMPARISON: None.                                                          IMPRESSION: No evidence of fracture or AVN. Hip joint spaces appear  well preserved. Unremarkable.    Assessment:  1. It band syndrome, left    2. Greater trochanteric pain syndrome      Discussed the diagnosis of greater trochanteric pain syndrome and contributing factors to lateral hip pain including gluteal tendinopathy, IT Band Syndrome and weak hip abductor muscles.  Discussed that these factors can cause inflammation in the greater trochanteric bursa.  I recommend physical therapy for overall hip strengthening.  Discussed that injections are sometimes helpful for point tenderness over the bursa, however, will not improve overall hip strength.  Would consider further work up and treatment  pending clinical course.  - lumbar etiology less likely given current exam    Plan:  - Today's Plan of Care:  Rehab: Physical Therapy: [facility] - [phone]    -We also discussed other future treatment options:  MRI of the left hip    Follow Up: 6 - 8 weeks    Concerning signs and symptoms were reviewed.  The patient expressed understanding of this management plan and all questions were answered at this time.    Shante Moreno MD CA  Primary Care Sports Medicine  [facility]    Again, thank you for allowing me to participate in the care of your patient.        Sincerely,        Shante Moreno MD

## 2019-03-27 NOTE — PATIENT INSTRUCTIONS
Plan:  - Today's Plan of Care:  Rehab: Physical Therapy: [facility] - [phone]    -We also discussed other future treatment options:  MRI of the left hip    Follow Up: 6 - 8 weeks    If you have any further questions for your physician or physician s care team you can call [phone] and use option 3 to leave a voice message. Calls received during business hours will be returned same day.

## 2019-04-02 ENCOUNTER — HOSPITAL ENCOUNTER (OUTPATIENT)
Dept: PHYSICAL THERAPY | Facility: CLINIC | Age: 79
Setting detail: THERAPIES SERIES
End: 2019-04-02
Attending: PEDIATRICS
Payer: COMMERCIAL

## 2019-04-02 DIAGNOSIS — M25.559 GREATER TROCHANTERIC PAIN SYNDROME: ICD-10-CM

## 2019-04-02 DIAGNOSIS — M76.32 IT BAND SYNDROME, LEFT: ICD-10-CM

## 2019-04-02 PROCEDURE — 97110 THERAPEUTIC EXERCISES: CPT | Mod: GP | Performed by: PHYSICAL THERAPIST

## 2019-04-02 PROCEDURE — 97161 PT EVAL LOW COMPLEX 20 MIN: CPT | Mod: GP | Performed by: PHYSICAL THERAPIST

## 2019-04-02 NOTE — PROGRESS NOTES
04/02/19 0700   General Information   Type of Visit Initial OP Ortho PT Evaluation   Start of Care Date 04/02/19   Referring Physician Shante Moreno MD   Patient/Family Goals Statement To get back on the treadmill   Orders Evaluate and Treat   Date of Order 03/27/19   Insurance Type Medicare;are   Insurance Comments/Visits Authorized [employer] >20 visits requires pre auth; Cert required   Medical Diagnosis IT band syndrome, left; Greater trochanteric pain sydrome   Surgical/Medical history reviewed Yes   Precautions/Limitations no known precautions/limitations   Body Part(s)   Body Part(s) Hip   Presentation and Etiology   Pertinent history of current problem (include personal factors and/or comorbidities that impact the POC) Pt reports: she has had chronic left hip pain for years.  Recieved 2 cortisone injections which helped for a short amoount of time.  Patient notes she climbs stairs step - to gait pattern and is favoring her left leg quite a bit.  Patient does note mild low back pain with prolonged walking.   Impairments A. Pain;F. Decreased strength and endurance;H. Impaired gait   Functional Limitations perform activities of daily living;perform desired leisure / sports activities   Symptom Location Left lateral hip, lateral thigh to knee   How/Where did it occur From insidious onset   Onset date of current episode/exacerbation 10/02/18   Chronicity Chronic   Pain rating (0-10 point scale) Best (/10);Worst (/10)   Best (/10) 1   Worst (/10) 6   Pain quality C. Aching   Frequency of pain/symptoms C. With activity   Pain/symptoms are: The same all the time   Pain/symptoms exacerbated by A. Sitting;B. Walking   Pain/symptoms eased by I. OTC medication(s)   Progression of symptoms since onset: Worsened   Current / Previous Interventions   Diagnostic Tests: X-ray   X-ray Results Results   X-ray results 9/11/18 XR pelvis and hip: IMPRESSION: No evidence of fracture or AVN. Hip joint spaces appear well preserved.  Unremarkable.   Prior Level of Function   Prior Level of Function-Mobility Independent   Prior Level of Function-ADLs Independent   Fall Risk Screen   Fall screen completed by PT   Have you fallen 2 or more times in the past year? No   Have you fallen and had an injury in the past year? No   Is patient a fall risk? No   Hip Objective Findings   Side (if bilateral, select both right and left) Left   Gait/Locomotion Left compensated Trendelenburg   Balance/Proprioception (Single Leg Stance) Right=3 sec; left=0 sec   Palpation Tender with palpation of mid portion IT band   Left Hip Flexion PROM right=120 deg; left=110   Left Hip ER PROM right=30 deg; left=20 deg   Left Hip IR PROM right=20 deg; left=30 deg   Left Hip Flexion Strength 4/5   Left Hip Abduction Strength 2/5   Left Hip IR Strength 4/5   Left Hip ER Strength 4/5   Left Knee Flexion Strength 4+/5   Left Knee Extension Strength 4+/5   Left Piriformis Flexibility Limited with reduced hip ROM   Planned Therapy Interventions   Planned Therapy Interventions ADL retraining;balance training;gait training;joint mobilization;manual therapy;motor coordination training;neuromuscular re-education;ROM;strengthening;stretching   Planned Modality Interventions   Planned Modality Interventions Cryotherapy   Clinical Impression   Criteria for Skilled Therapeutic Interventions Met yes, treatment indicated   PT Diagnosis Left global hip weakness with TFL compensation / IT band syndrome   Influenced by the following impairments Pain; weakness; impaired ROM; impaired gait and proprioception   Functional limitations due to impairments Pain and difficulty with ADL's, climbing stairs, sitting for prolonged periods of time   Clinical Presentation Stable/Uncomplicated   Clinical Presentation Rationale (+) motivation; overall health (-) chronicity   Clinical Decision Making (Complexity) Low complexity   Therapy Frequency 1 time/week   Predicted Duration of Therapy Intervention  (days/wks) 10 weeks   Risk & Benefits of therapy have been explained Yes   Patient, Family & other staff in agreement with plan of care Yes   Clinical Impression Comments Susana arrives today with chronic left hip pain; she will benefit from skilled PT to normalize strength and mobility.   Education Assessment   Preferred Learning Style Listening;Demonstration;Pictures/video   Barriers to Learning No barriers   ORTHO GOALS   PT Ortho Eval Goals 1;2;3   Ortho Goal 1   Goal Description Patient will have >=4+/5 left hip ABD strength to reduce IT band compensation.   Target Date 05/28/19   Ortho Goal 2   Goal Description Patient will be able to participate in desired treadmill routine without left hip pain.   Target Date 06/11/19   Ortho Goal 3   Goal Description Patient will be independent with her HEP to reduce future occurrence of pain and disability.   Target Date 04/23/19   Therapy Certification   Certification date from 04/02/19   Certification date to 06/11/19   Medical Diagnosis IT band syndrome, left; Greater trochanteric pain sydrome       Jessica Whitmore, PT, DPT  Doctor of Physical Therapy #[id number]  [facility]  [phone]  [email or web address]

## 2019-04-02 NOTE — PROGRESS NOTES
[facility]          OUTPATIENT PHYSICAL THERAPY ORTHOPEDIC EVALUATION  PLAN OF TREATMENT FOR OUTPATIENT REHABILITATION  (COMPLETE FOR INITIAL CLAIMS ONLY)  Patient's Last Name, First Name, M.I.  YOB: 1940  Susana Duenas    Provider s Name:  [facility]   Medical Record No.  [id number]   Start of Care Date:  04/02/19   Onset Date:  10/02/18   Type:     _X__PT   ___OT   ___SLP Medical Diagnosis:  IT band syndrome, left; Greater trochanteric pain sydrome     PT Diagnosis:  Left global hip weakness with TFL compensation / IT band syndrome   Visits from SOC:  1      _________________________________________________________________________________  Plan of Treatment/Functional Goals:  ADL retraining, balance training, gait training, joint mobilization, manual therapy, motor coordination training, neuromuscular re-education, ROM, strengthening, stretching     Cryotherapy     Goals     Goal Description: Patient will have >=4+/5 left hip ABD strength to reduce IT band compensation.  Target Date: 05/28/19       Goal Description: Patient will be able to participate in desired treadmill routine without left hip pain.  Target Date: 06/11/19       Goal Description: Patient will be independent with her HEP to reduce future occurrence of pain and disability.  Target Date: 04/23/19      Therapy Frequency:  1 time/week  Predicted Duration of Therapy Intervention:  10 weeks    Jessica Whitmore, PT                 I CERTIFY THE NEED FOR THESE SERVICES FURNISHED UNDER        THIS PLAN OF TREATMENT AND WHILE UNDER MY CARE     (Physician co-signature of this document indicates review and certification of the therapy plan).                       Certification Date From:  04/02/19   Certification Date To:  06/11/19    Referring Provider:  Shante Moreno MD    Initial Assessment        See Epic Evaluation Start of Care Date: 04/02/19

## 2019-04-09 ENCOUNTER — HOSPITAL ENCOUNTER (OUTPATIENT)
Dept: PHYSICAL THERAPY | Facility: CLINIC | Age: 79
Setting detail: THERAPIES SERIES
End: 2019-04-09
Attending: PEDIATRICS
Payer: COMMERCIAL

## 2019-04-09 PROCEDURE — 97140 MANUAL THERAPY 1/> REGIONS: CPT | Mod: GP | Performed by: PHYSICAL THERAPIST

## 2019-04-09 PROCEDURE — 97110 THERAPEUTIC EXERCISES: CPT | Mod: GP | Performed by: PHYSICAL THERAPIST

## 2019-04-15 ENCOUNTER — HOSPITAL ENCOUNTER (OUTPATIENT)
Dept: PHYSICAL THERAPY | Facility: CLINIC | Age: 79
Setting detail: THERAPIES SERIES
End: 2019-04-15
Attending: PEDIATRICS
Payer: COMMERCIAL

## 2019-04-15 PROCEDURE — 97110 THERAPEUTIC EXERCISES: CPT | Mod: GP | Performed by: PHYSICAL THERAPIST

## 2019-04-30 ENCOUNTER — HOSPITAL ENCOUNTER (OUTPATIENT)
Dept: PHYSICAL THERAPY | Facility: CLINIC | Age: 79
Setting detail: THERAPIES SERIES
End: 2019-04-30
Attending: PEDIATRICS
Payer: COMMERCIAL

## 2019-04-30 PROCEDURE — 97140 MANUAL THERAPY 1/> REGIONS: CPT | Mod: GP | Performed by: PHYSICAL THERAPIST

## 2019-04-30 PROCEDURE — 97110 THERAPEUTIC EXERCISES: CPT | Mod: GP | Performed by: PHYSICAL THERAPIST

## 2019-05-14 ENCOUNTER — HOSPITAL ENCOUNTER (OUTPATIENT)
Dept: PHYSICAL THERAPY | Facility: CLINIC | Age: 79
Setting detail: THERAPIES SERIES
End: 2019-05-14
Attending: PEDIATRICS
Payer: COMMERCIAL

## 2019-05-14 PROCEDURE — 97110 THERAPEUTIC EXERCISES: CPT | Mod: GP | Performed by: PHYSICAL THERAPIST

## 2019-05-14 PROCEDURE — 97140 MANUAL THERAPY 1/> REGIONS: CPT | Mod: GP | Performed by: PHYSICAL THERAPIST

## 2019-11-21 ENCOUNTER — IMMUNIZATION (OUTPATIENT)
Dept: FAMILY MEDICINE | Facility: CLINIC | Age: 79
End: 2019-11-21
Payer: COMMERCIAL

## 2019-11-21 DIAGNOSIS — Z23 NEED FOR PROPHYLACTIC VACCINATION AND INOCULATION AGAINST INFLUENZA: Primary | ICD-10-CM

## 2019-11-21 PROCEDURE — 90662 IIV NO PRSV INCREASED AG IM: CPT

## 2019-11-21 PROCEDURE — G0008 ADMIN INFLUENZA VIRUS VAC: HCPCS

## 2019-11-21 PROCEDURE — 99207 ZZC NO CHARGE NURSE ONLY: CPT

## 2019-12-02 NOTE — ADDENDUM NOTE
Encounter addended by: Jessica Whitmore, PT on: 12/2/2019 2:21 PM   Actions taken: Clinical Note Signed, Episode resolved

## 2019-12-02 NOTE — PROGRESS NOTES
"Outpatient Physical Therapy Discharge Note     Patient: Susana Duenas  : 1940    Beginning/End Dates of Reporting Period:  19 to 2019    Referring Provider: Shante Moreno MD    Therapy Diagnosis: Left hip pain     Client Self Report: Treadmill is going good, up to 2.3 miles.  Also using the stairs for exercise.  Still working on going down stairs.    Objective Measurements:  Objective Measure: Left hip ABD strength  Details: 4+/5  Objective Measure: Step up height left  Details: 8\"  Objective Measure: Lower Extremity Functional Scale  Details: 72/80(10% disability; 65% improvement from initial evaluation)            Goals:  Goal Identifier     Goal Description Patient will have >=4+/5 left hip ABD strength to reduce IT band compensation.   Target Date 19   Date Met  19   Progress:     Goal Identifier     Goal Description Patient will be able to participate in desired treadmill routine without left hip pain.   Target Date 19   Date Met  19   Progress:     Goal Identifier     Goal Description Patient will be independent with her HEP to reduce future occurrence of pain and disability.   Target Date 19   Date Met  19   Progress:     Progress Toward Goals:   Progress this reporting period: Susana made excellent progress with pain reduction, hip strength, and return to normalized walking.  She is independent with HEP and all goals have been met.      Plan:  Discharge from therapy.    Discharge:    Reason for Discharge: Patient has met all goals.    Equipment Issued: Tehraband    Discharge Plan: Patient to continue home program.      Jessica Whitmore, PT, DTP, SCS  Doctor of Physical Therapy #[id number]  [facility]  [phone]  [email or web address]    "

## 2019-12-16 ENCOUNTER — OFFICE VISIT (OUTPATIENT)
Dept: FAMILY MEDICINE | Facility: CLINIC | Age: 79
End: 2019-12-16
Payer: COMMERCIAL

## 2019-12-16 VITALS
HEART RATE: 76 BPM | OXYGEN SATURATION: 100 % | DIASTOLIC BLOOD PRESSURE: 82 MMHG | HEIGHT: 64 IN | BODY MASS INDEX: 29.74 KG/M2 | RESPIRATION RATE: 16 BRPM | SYSTOLIC BLOOD PRESSURE: 124 MMHG | WEIGHT: 174.2 LBS | TEMPERATURE: 98 F

## 2019-12-16 DIAGNOSIS — G43.719 INTRACTABLE CHRONIC MIGRAINE WITHOUT AURA AND WITHOUT STATUS MIGRAINOSUS: ICD-10-CM

## 2019-12-16 DIAGNOSIS — E78.5 HYPERLIPIDEMIA LDL GOAL <130: ICD-10-CM

## 2019-12-16 LAB
CHOLEST SERPL-MCNC: 159 MG/DL
GLUCOSE SERPL-MCNC: 97 MG/DL (ref 70–99)
HDLC SERPL-MCNC: 62 MG/DL
LDLC SERPL CALC-MCNC: 78 MG/DL
NONHDLC SERPL-MCNC: 97 MG/DL
TRIGL SERPL-MCNC: 97 MG/DL

## 2019-12-16 PROCEDURE — 99214 OFFICE O/P EST MOD 30 MIN: CPT | Performed by: FAMILY MEDICINE

## 2019-12-16 PROCEDURE — 80061 LIPID PANEL: CPT | Performed by: FAMILY MEDICINE

## 2019-12-16 PROCEDURE — 36415 COLL VENOUS BLD VENIPUNCTURE: CPT | Performed by: FAMILY MEDICINE

## 2019-12-16 PROCEDURE — 82947 ASSAY GLUCOSE BLOOD QUANT: CPT | Performed by: FAMILY MEDICINE

## 2019-12-16 RX ORDER — PROCHLORPERAZINE MALEATE 5 MG
5 TABLET ORAL 3 TIMES DAILY PRN
Qty: 30 TABLET | Refills: 2 | Status: SHIPPED | OUTPATIENT
Start: 2019-12-16 | End: 2020-11-20

## 2019-12-16 RX ORDER — SIMVASTATIN 20 MG
20 TABLET ORAL AT BEDTIME
Qty: 90 TABLET | Refills: 3 | Status: SHIPPED | OUTPATIENT
Start: 2019-12-16 | End: 2020-11-20

## 2019-12-16 RX ORDER — BUTALBITAL, ACETAMINOPHEN AND CAFFEINE 50; 325; 40 MG/1; MG/1; MG/1
1-2 TABLET ORAL EVERY 4 HOURS PRN
Qty: 100 TABLET | Refills: 2 | Status: SHIPPED | OUTPATIENT
Start: 2019-12-16 | End: 2020-11-20

## 2019-12-16 RX ORDER — BUTALBITAL, ACETAMINOPHEN AND CAFFEINE 50; 325; 40 MG/1; MG/1; MG/1
1-2 TABLET ORAL EVERY 4 HOURS PRN
Qty: 100 TABLET | Refills: 2 | Status: CANCELLED | OUTPATIENT
Start: 2019-12-16

## 2019-12-16 ASSESSMENT — MIFFLIN-ST. JEOR: SCORE: 1260.17

## 2019-12-16 NOTE — NURSING NOTE
"Chief Complaint   Patient presents with     Migraine Mgmt     Lipids       Initial /82 (BP Location: Right arm, Patient Position: Chair, Cuff Size: Adult Large)   Pulse 76   Temp 98  F (36.7  C) (Tympanic)   Resp 16   Ht 1.626 m (5' 4\")   Wt 79 kg (174 lb 3.2 oz)   SpO2 100%   BMI 29.90 kg/m   Estimated body mass index is 29.9 kg/m  as calculated from the following:    Height as of this encounter: 1.626 m (5' 4\").    Weight as of this encounter: 79 kg (174 lb 3.2 oz).    Patient presents to the clinic using No DME    Health Maintenance that is potentially due pending provider review:  NONE    n/a    Is there anyone who you would like to be able to receive your results? No  If yes have patient fill out [facility]    "

## 2019-12-16 NOTE — PROGRESS NOTES
"Subjective     Susana Duenas is a 79 year old female who presents to clinic today for the following health issues:    HPI   Hyperlipidemia Follow-Up      Are you regularly taking any medication or supplement to lower your cholesterol?   Yes- SIMVASTATIN    Are you having muscle aches or other side effects that you think could be caused by your cholesterol lowering medication?  No    Migraine     Since your last clinic visit, how have your headaches changed?  No change    How often are you getting headaches or migraines? varies     Are you able to do normal daily activities when you have a migraine? Yes, after taking meds    Are you taking rescue/relief medications? (Select all that apply) Other: fioricet    How helpful is your rescue/relief medication?  I get total relief    Are you taking any medications to prevent migraines? (Select all that apply)  No    In the past 4 weeks, how often have you gone to urgent care or the emergency room because of your headaches?  0      How many servings of fruits and vegetables do you eat daily?  4 or more    On average, how many sweetened beverages do you drink each day (Examples: soda, juice, sweet tea, etc.  Do NOT count diet or artificially sweetened beverages)?   0    How many days per week do you miss taking your medication? 0              Reviewed and updated as needed this visit by Provider  Tobacco  Allergies  Meds  Problems  Med Hx  Surg Hx  Fam Hx         Review of Systems   ROS COMP: Constitutional, HEENT, cardiovascular, pulmonary, gi and gu systems are negative, except as otherwise noted.      Objective    /82 (BP Location: Right arm, Patient Position: Chair, Cuff Size: Adult Large)   Pulse 76   Temp 98  F (36.7  C) (Tympanic)   Resp 16   Ht 1.626 m (5' 4\")   Wt 79 kg (174 lb 3.2 oz)   SpO2 100%   BMI 29.90 kg/m    Body mass index is 29.9 kg/m .  Physical Exam   GENERAL APPEARANCE: healthy, alert and no distress  HENT: ear canals and TM's normal " "and nose and mouth without ulcers or lesions  NECK: no adenopathy, no asymmetry, masses, or scars and thyroid normal to palpation  RESP: lungs clear to auscultation - no rales, rhonchi or wheezes  CV: regular rates and rhythm, normal S1 S2, no S3 or S4 and no murmur, click or rub  PSYCH: mentation appears normal and affect normal/bright    Diagnostic Test Results:  Labs reviewed in Epic        Assessment & Plan     1. Hyperlipidemia LDL goal <130  Adjust meds as indicated by above labs.   - simvastatin (ZOCOR) 20 MG tablet; Take 1 tablet (20 mg) by mouth At Bedtime  Dispense: 90 tablet; Refill: 3  - Lipid panel reflex to direct LDL Fasting  - Glucose    2. Intractable chronic migraine without aura and without status migrainosus  Stable no change in treatment plan.   - prochlorperazine (COMPAZINE) 5 MG tablet; Take 1 tablet (5 mg) by mouth 3 times daily as needed for nausea (with migraine) for nausea.  Dispense: 30 tablet; Refill: 2  - butalbital-acetaminophen-caffeine (FIORICET/ESGIC) -40 MG tablet; Take 1-2 tablets by mouth every 4 hours as needed for pain  Dispense: 100 tablet; Refill: 2     BMI:   Estimated body mass index is 29.9 kg/m  as calculated from the following:    Height as of this encounter: 1.626 m (5' 4\").    Weight as of this encounter: 79 kg (174 lb 3.2 oz).           Patient Instructions   Labs today     meds refilled       Return in about 1 year (around 12/16/2020).      Risks, benefits, side effects and rationale for treatment plan fully discussed with the patient and understanding expressed.   Heena Ny MD  [facility]      "

## 2019-12-17 ENCOUNTER — TELEPHONE (OUTPATIENT)
Dept: FAMILY MEDICINE | Facility: CLINIC | Age: 79
End: 2019-12-17

## 2019-12-17 NOTE — TELEPHONE ENCOUNTER
Prior Authorization Retail Medication Request    Medication/Dose: butal/acetam/cf  ICD code (if different than what is on RX): Intractable chronic migraine without aura and without status migrainosus [G43.719]    Previously Tried and Failed:  Pt has been stable on this med and uses for rescue/relief for migraines.  Rationale:       Insurance Name:  Medicare [id number]  Insurance ID:  [id number]      Pharmacy Information (if different than what is on RX)  Name:   [facility]  Phone:

## 2019-12-17 NOTE — TELEPHONE ENCOUNTER
Central Prior Authorization Team  Phone: [phone]    PA Initiation    Medication: butal/acetam/cf  Insurance Company: [employer] - Phone [phone] Fax [phone]  Pharmacy Filling the Rx: [facility] [address] [place] [address]  Filling Pharmacy Phone: [phone]  Filling Pharmacy Fax:    Start Date: 12/17/2019

## 2019-12-17 NOTE — TELEPHONE ENCOUNTER
Prior Authorization Approval    Authorization Effective Date: 11/17/2019  Authorization Expiration Date: 12/16/2020  Medication: butal/acetam/cf- APPROVED   Approved Dose/Quantity:   Reference #:     Insurance Company: [employer] - Phone [phone] Fax [phone]  Expected CoPay:       CoPay Card Available:      Foundation Assistance Needed:    Which Pharmacy is filling the prescription (Not needed for infusion/clinic administered): [facility] [address] [place] [address]  Pharmacy Notified: Yes  Patient Notified: Comment:  **Instructed pharmacy to notify patient when script is ready to /ship.**

## 2020-03-22 ENCOUNTER — HEALTH MAINTENANCE LETTER (OUTPATIENT)
Age: 80
End: 2020-03-22

## 2020-10-30 ENCOUNTER — IMMUNIZATION (OUTPATIENT)
Dept: FAMILY MEDICINE | Facility: CLINIC | Age: 80
End: 2020-10-30
Payer: COMMERCIAL

## 2020-10-30 PROCEDURE — G0008 ADMIN INFLUENZA VIRUS VAC: HCPCS

## 2020-10-30 PROCEDURE — 90662 IIV NO PRSV INCREASED AG IM: CPT

## 2020-11-20 ENCOUNTER — OFFICE VISIT (OUTPATIENT)
Dept: FAMILY MEDICINE | Facility: CLINIC | Age: 80
End: 2020-11-20
Payer: COMMERCIAL

## 2020-11-20 VITALS
WEIGHT: 179 LBS | DIASTOLIC BLOOD PRESSURE: 80 MMHG | TEMPERATURE: 97 F | HEART RATE: 82 BPM | OXYGEN SATURATION: 97 % | RESPIRATION RATE: 16 BRPM | BODY MASS INDEX: 30.56 KG/M2 | SYSTOLIC BLOOD PRESSURE: 130 MMHG | HEIGHT: 64 IN

## 2020-11-20 DIAGNOSIS — E78.5 HYPERLIPIDEMIA LDL GOAL <130: ICD-10-CM

## 2020-11-20 DIAGNOSIS — G43.719 INTRACTABLE CHRONIC MIGRAINE WITHOUT AURA AND WITHOUT STATUS MIGRAINOSUS: ICD-10-CM

## 2020-11-20 LAB
ANION GAP SERPL CALCULATED.3IONS-SCNC: 4 MMOL/L (ref 3–14)
BUN SERPL-MCNC: 23 MG/DL (ref 7–30)
CALCIUM SERPL-MCNC: 9.6 MG/DL (ref 8.5–10.1)
CHLORIDE SERPL-SCNC: 107 MMOL/L (ref 94–109)
CHOLEST SERPL-MCNC: 159 MG/DL
CO2 SERPL-SCNC: 29 MMOL/L (ref 20–32)
CREAT SERPL-MCNC: 0.89 MG/DL (ref 0.52–1.04)
GFR SERPL CREATININE-BSD FRML MDRD: 62 ML/MIN/{1.73_M2}
GLUCOSE SERPL-MCNC: 99 MG/DL (ref 70–99)
HDLC SERPL-MCNC: 64 MG/DL
LDLC SERPL CALC-MCNC: 67 MG/DL
NONHDLC SERPL-MCNC: 95 MG/DL
POTASSIUM SERPL-SCNC: 4.2 MMOL/L (ref 3.4–5.3)
SODIUM SERPL-SCNC: 140 MMOL/L (ref 133–144)
TRIGL SERPL-MCNC: 140 MG/DL

## 2020-11-20 PROCEDURE — 36415 COLL VENOUS BLD VENIPUNCTURE: CPT | Performed by: FAMILY MEDICINE

## 2020-11-20 PROCEDURE — 99214 OFFICE O/P EST MOD 30 MIN: CPT | Performed by: FAMILY MEDICINE

## 2020-11-20 PROCEDURE — 80061 LIPID PANEL: CPT | Performed by: FAMILY MEDICINE

## 2020-11-20 PROCEDURE — 80048 BASIC METABOLIC PNL TOTAL CA: CPT | Performed by: FAMILY MEDICINE

## 2020-11-20 RX ORDER — SIMVASTATIN 20 MG
20 TABLET ORAL AT BEDTIME
Qty: 90 TABLET | Refills: 3 | Status: SHIPPED | OUTPATIENT
Start: 2020-11-20 | End: 2022-01-12

## 2020-11-20 RX ORDER — PROCHLORPERAZINE MALEATE 5 MG
5 TABLET ORAL 3 TIMES DAILY PRN
Qty: 30 TABLET | Refills: 2 | Status: SHIPPED | OUTPATIENT
Start: 2020-11-20 | End: 2022-01-12

## 2020-11-20 RX ORDER — BUTALBITAL, ACETAMINOPHEN AND CAFFEINE 50; 325; 40 MG/1; MG/1; MG/1
1-2 TABLET ORAL EVERY 4 HOURS PRN
Qty: 100 TABLET | Refills: 2 | Status: SHIPPED | OUTPATIENT
Start: 2020-11-20 | End: 2022-01-12

## 2020-11-20 RX ORDER — BUTALBITAL, ACETAMINOPHEN AND CAFFEINE 50; 325; 40 MG/1; MG/1; MG/1
1-2 TABLET ORAL EVERY 4 HOURS PRN
Qty: 100 TABLET | Refills: 2 | Status: CANCELLED | OUTPATIENT
Start: 2020-11-20

## 2020-11-20 ASSESSMENT — MIFFLIN-ST. JEOR: SCORE: 1276.94

## 2020-11-20 NOTE — PROGRESS NOTES
Subjective     Susana Duenas is a 80 year old female who presents to clinic today for the following health issues:    HPI         Hyperlipidemia Follow-Up      Are you regularly taking any medication or supplement to lower your cholesterol?   Yes- simvastatin    Are you having muscle aches or other side effects that you think could be caused by your cholesterol lowering medication?  No    Migraine     Since your last clinic visit, how have your headaches changed?  No change    How often are you getting headaches or migraines? Varies      Are you able to do normal daily activities when you have a migraine? Yes    Are you taking rescue/relief medications? (Select all that apply) naproxyn (Aleve) and floricet     How helpful is your rescue/relief medication?  I get some relief    Are you taking any medications to prevent migraines? (Select all that apply)  No    In the past 4 weeks, how often have you gone to urgent care or the emergency room because of your headaches?  0      How many servings of fruits and vegetables do you eat daily?  2-3    On average, how many sweetened beverages do you drink each day (Examples: soda, juice, sweet tea, etc.  Do NOT count diet or artificially sweetened beverages)?   0    How many days per week do you miss taking your medication? 0          Review of Systems   Constitutional, HEENT, cardiovascular, pulmonary, gi and gu systems are negative, except as otherwise noted.      Objective    There were no vitals taken for this visit.  There is no height or weight on file to calculate BMI.  Physical Exam   GENERAL APPEARANCE: healthy, alert and no distress  NECK: no adenopathy, no asymmetry, masses, or scars and thyroid normal to palpation  RESP: lungs clear to auscultation - no rales, rhonchi or wheezes  CV: regular rates and rhythm, normal S1 S2, no S3 or S4 and no murmur, click or rub  PSYCH: mentation appears normal and affect normal/bright            Assessment & Plan     Intractable  "chronic migraine without aura and without status migrainosus  Stable no change in treatment plan.   - prochlorperazine (COMPAZINE) 5 MG tablet; Take 1 tablet (5 mg) by mouth 3 times daily as needed for nausea (with migraine) for nausea.  - Basic metabolic panel  - butalbital-acetaminophen-caffeine (ESGIC) -40 MG tablet; Take 1-2 tablets by mouth every 4 hours as needed for pain    Hyperlipidemia LDL goal <130  Adjust meds as indicated by above labs.   - simvastatin (ZOCOR) 20 MG tablet; Take 1 tablet (20 mg) by mouth At Bedtime  - Lipid panel reflex to direct LDL Fasting     BMI:   Estimated body mass index is 30.73 kg/m  as calculated from the following:    Height as of this encounter: 1.626 m (5' 4\").    Weight as of this encounter: 81.2 kg (179 lb).            Patient Instructions   No med changes     Labs today         Return in about 1 month (around 12/20/2020) for medicare wellness.    Heena Ny MD  [facility]    "

## 2020-12-29 ENCOUNTER — VIRTUAL VISIT (OUTPATIENT)
Dept: FAMILY MEDICINE | Facility: CLINIC | Age: 80
End: 2020-12-29
Payer: COMMERCIAL

## 2020-12-29 VITALS — BODY MASS INDEX: 29.71 KG/M2 | HEIGHT: 64 IN | WEIGHT: 174 LBS

## 2020-12-29 DIAGNOSIS — Z00.00 ENCOUNTER FOR MEDICARE ANNUAL WELLNESS EXAM: ICD-10-CM

## 2020-12-29 DIAGNOSIS — Z00.00 MEDICARE ANNUAL WELLNESS VISIT, SUBSEQUENT: Primary | ICD-10-CM

## 2020-12-29 PROCEDURE — G0439 PPPS, SUBSEQ VISIT: HCPCS | Mod: 95 | Performed by: FAMILY MEDICINE

## 2020-12-29 ASSESSMENT — MIFFLIN-ST. JEOR: SCORE: 1254.26

## 2020-12-29 NOTE — PROGRESS NOTES
"Susana Duenas is a 80 year old female who is being evaluated via a billable telephone visit.      The patient has been notified of following:     \"This telephone visit will be conducted via a call between you and your physician/provider. We have found that certain health care needs can be provided without the need for a physical exam.  This service lets us provide the care you need with a short phone conversation.  If a prescription is necessary we can send it directly to your pharmacy.  If lab work is needed we can place an order for that and you can then stop by our lab to have the test done at a later time.    Telephone visits are billed at different rates depending on your insurance coverage. During this emergency period, for some insurers they may be billed the same as an in-person visit.  Please reach out to your insurance provider with any questions.    If during the course of the call the physician/provider feels a telephone visit is not appropriate, you will not be charged for this service.\"     Patient has given verbal consent for Telephone visit?  Yes    What phone number would you like to be contacted at? [phone]    How would you like to obtain your AVS? Nikki Isabel     Susana Duenas is a 80 year old female who presents via phone visit today for the following health issues:    HPI     Annual Wellness Visit    Patient has been advised of split billing requirements and indicates understanding: Yes     Are you in the first 12 months of your Medicare Part B coverage?  No    Physical Health:    In general, how would you rate your overall physical health? excellent    Outside of work, how many days during the week do you exercise?2-3 days/week    Outside of work, approximately how many minutes a day do you exercise?15-30 minutes    If you drink alcohol do you typically have >3 drinks per day or >7 drinks per week? No    Do you usually eat at least 4 servings of fruit and vegetables a day, " "include whole grains & fiber and avoid regularly eating high fat or \"junk\" foods? Yes    Do you have any problems taking medications regularly? No    Do you have any side effects from medications? none    Needs assistance for the following daily activities: no assistance needed    Which of the following safety concerns are present in your home?  none identified     Hearing impairment: No    In the past 6 months, have you been bothered by leaking of urine? no    Ht 1.626 m (5' 4\")   Wt 78.9 kg (174 lb)   BMI 29.87 kg/m    Weight: Based on patient-provided information  Height: Based on patient-provided information  BMI:  Blood Pressure: Unable to obtain due to video visit    Mental Health:    In general, how would you rate your overall mental or emotional health? excellent  PHQ-2 Score:      Do you feel safe in your environment? Yes    Have you ever done Advance Care Planning? (For example, a Health Directive, POLST, or a discussion with a medical provider or your loved ones about your wishes)? Yes, advance care planning is on file.    Fall risk:  Fallen 2 or more times in the past year?: No  Any fall with injury in the past year?: No    Cognitive Screening: Unable to complete due to virtual visit; need for additional assessment in future face-to-face visit    Do you have sleep apnea, excessive snoring or daytime drowsiness?: no    Current providers sharing in care for this patient include:   Patient Care Team:  Heena Ny MD as PCP - General (Family Practice)  Heena Ny MD as Assigned PCP    Patient has been advised of split billing requirements and indicates understanding: Yes           Review of Systems   Constitutional, HEENT, cardiovascular, pulmonary, gi and gu systems are negative, except as otherwise noted.       Objective          Vitals:  No vitals were obtained today due to virtual visit.    healthy, alert and no distress  PSYCH: Alert and oriented times 3; coherent speech, normal "   rate and volume, able to articulate logical thoughts, able   to abstract reason, no tangential thoughts, no hallucinations   or delusions  Her affect is normal  RESP: No cough, no audible wheezing, able to talk in full sentences  Remainder of exam unable to be completed due to telephone visits            Assessment/Plan:    Assessment & Plan     Medicare annual wellness visit, subsequent  No risk             Patient Instructions       Patient Education   Personalized Prevention Plan  You are due for the preventive services outlined below.  Your care team is available to assist you in scheduling these services.  If you have already completed any of these items, please share that information with your care team to update in your medical record.  Health Maintenance Due   Topic Date Due     Hepatitis C Screening  10/12/1958     Zoster (Shingles) Vaccine (2 of 3) 08/13/2008     Diptheria Tetanus Pertussis (DTAP/TDAP/TD) Vaccine (2 - Td) 04/01/2015     FALL RISK ASSESSMENT  12/16/2020            Return in about 53 weeks (around 1/4/2022) for Annual Wellness Visit.    Heena Ny MD  [facility]    Phone call duration:  15 minutes

## 2020-12-29 NOTE — PATIENT INSTRUCTIONS
Patient Education   Personalized Prevention Plan  You are due for the preventive services outlined below.  Your care team is available to assist you in scheduling these services.  If you have already completed any of these items, please share that information with your care team to update in your medical record.  Health Maintenance Due   Topic Date Due     Hepatitis C Screening  10/12/1958     Zoster (Shingles) Vaccine (2 of 3) 08/13/2008     Diptheria Tetanus Pertussis (DTAP/TDAP/TD) Vaccine (2 - Td) 04/01/2015     FALL RISK ASSESSMENT  12/16/2020

## 2021-02-23 ENCOUNTER — IMMUNIZATION (OUTPATIENT)
Dept: FAMILY MEDICINE | Facility: CLINIC | Age: 81
End: 2021-02-23
Payer: COMMERCIAL

## 2021-02-23 PROCEDURE — 91300 PR COVID VAC PFIZER DIL RECON 30 MCG/0.3 ML IM: CPT

## 2021-02-23 PROCEDURE — 0001A PR COVID VAC PFIZER DIL RECON 30 MCG/0.3 ML IM: CPT

## 2021-03-16 ENCOUNTER — IMMUNIZATION (OUTPATIENT)
Dept: FAMILY MEDICINE | Facility: CLINIC | Age: 81
End: 2021-03-16
Attending: FAMILY MEDICINE
Payer: COMMERCIAL

## 2021-03-16 PROCEDURE — 91300 PR COVID VAC PFIZER DIL RECON 30 MCG/0.3 ML IM: CPT

## 2021-03-16 PROCEDURE — 0002A PR COVID VAC PFIZER DIL RECON 30 MCG/0.3 ML IM: CPT

## 2021-09-04 ENCOUNTER — HEALTH MAINTENANCE LETTER (OUTPATIENT)
Age: 81
End: 2021-09-04

## 2021-10-13 ENCOUNTER — OFFICE VISIT (OUTPATIENT)
Dept: FAMILY MEDICINE | Facility: CLINIC | Age: 81
End: 2021-10-13
Payer: COMMERCIAL

## 2021-10-13 VITALS
WEIGHT: 180.4 LBS | DIASTOLIC BLOOD PRESSURE: 82 MMHG | OXYGEN SATURATION: 98 % | HEIGHT: 64 IN | RESPIRATION RATE: 78 BRPM | SYSTOLIC BLOOD PRESSURE: 138 MMHG | TEMPERATURE: 97.4 F | BODY MASS INDEX: 30.8 KG/M2

## 2021-10-13 DIAGNOSIS — M10.072 ACUTE IDIOPATHIC GOUT INVOLVING TOE OF LEFT FOOT: Primary | ICD-10-CM

## 2021-10-13 PROCEDURE — 99213 OFFICE O/P EST LOW 20 MIN: CPT | Performed by: PHYSICIAN ASSISTANT

## 2021-10-13 RX ORDER — PREDNISONE 20 MG/1
TABLET ORAL
Qty: 20 TABLET | Refills: 0 | Status: SHIPPED | OUTPATIENT
Start: 2021-10-13 | End: 2022-01-12

## 2021-10-13 RX ORDER — PREDNISONE 20 MG/1
TABLET ORAL
Qty: 20 TABLET | Refills: 0 | Status: SHIPPED | OUTPATIENT
Start: 2021-10-13 | End: 2021-10-13

## 2021-10-13 ASSESSMENT — PAIN SCALES - GENERAL: PAINLEVEL: SEVERE PAIN (6)

## 2021-10-13 ASSESSMENT — MIFFLIN-ST. JEOR: SCORE: 1274.32

## 2021-10-13 NOTE — PROGRESS NOTES
"Assessment & Plan   Acute idiopathic gout involving toe of left foot  History and exam is mostly consistent with gout of the 1st MTP. Hx of similar symptoms 1-2 times in the past. Did consider DVT vs septic joint vs cellulitis. No hx of trauma to suspect a fracture/sprain. Symptoms have been present for 2 weeks so will treat with Prednisone taper. If not improved, patient will return to clinic for further evaluation to ensure not other cause for her gout. Warning signs and symptoms discussed on when to return to clinic or go to the ER. Pt verbalized understanding.    - predniSONE (DELTASONE) 20 MG tablet; Take 3 tabs by mouth daily x 3 days, then 2 tabs daily x 3 days, then 1 tab daily x 3 days, then 1/2 tab daily x 3 days.     BMI:   Estimated body mass index is 31.21 kg/m  as calculated from the following:    Height as of this encounter: 1.619 m (5' 3.75\").    Weight as of this encounter: 81.8 kg (180 lb 6.4 oz).     Return in about 2 weeks (around 10/27/2021), or if symptoms worsen or fail to improve, for In-Clinic Visit.    JUANA Kay [facility]    Chalo Meza is a 81 year old who presents for the following health issues     HPI   Concern -   Chief Complaint   Patient presents with     Edema     Left foot     Onset: 2 weeks  Description: edema left foot   Intensity: moderate, 6/10  Progression of Symptoms:  same  Accompanying Signs & Symptoms: some pain  Previous history of similar problem: hx of gout  Precipitating factors:        Worsened by: on her feet  Alleviating factors:        Improved by: res  Therapies tried and outcome: rest    Review of Systems   See HPI       Objective    /82 (BP Location: Right arm, Patient Position: Chair, Cuff Size: Adult Large)   Temp 97.4  F (36.3  C) (Tympanic)   Resp (!) 78   Ht 1.619 m (5' 3.75\")   Wt 81.8 kg (180 lb 6.4 oz)   SpO2 98%   Breastfeeding No   BMI 31.21 kg/m    Body mass index is 31.21 kg/m .  Physical " Exam   Constitutional: healthy, alert, and no distress  Head: Normocephalic. Atraumatic  Eyes: No conjunctival injection, sclera anicteric  Respiratory: No resp distress.  Musculoskeletal: extremities normal- no gross deformities noted, and normal muscle tone. Mild swelling and moderate tenderness located over the 1st MTP, with swelling extending into the great toe.  Neurologic: Gait normal. CN 2-12 grossly intact  Psychiatric: mentation appears normal and affect normal/bright   Skin: Erythema, warmth located around the 1st MTP of the left foot. No streaking or fluctuance.

## 2021-10-13 NOTE — NURSING NOTE
"No chief complaint on file.      Initial /82 (BP Location: Right arm, Patient Position: Chair, Cuff Size: Adult Large)   Temp 97.4  F (36.3  C) (Tympanic)   Resp (!) 78   Ht 1.619 m (5' 3.75\")   Wt 81.8 kg (180 lb 6.4 oz)   SpO2 98%   Breastfeeding No   BMI 31.21 kg/m   Estimated body mass index is 31.21 kg/m  as calculated from the following:    Height as of this encounter: 1.619 m (5' 3.75\").    Weight as of this encounter: 81.8 kg (180 lb 6.4 oz).    Patient presents to the clinic using No DME    Health Maintenance that is potentially due pending provider review:  willgetflu shot in acoupleweeks        Is there anyone who you would like to be able to receive your results? No  If yes have patient fill out [facility]    Dinorah Andres CMA    "

## 2021-10-13 NOTE — PATIENT INSTRUCTIONS
Start Prednisone for suspected gout. If not improved after completion of Prednisone, please return to the clinic for evaluation.     Watch out for symptoms in your calf like swelling, redness, warmth and pain in your calf.       Patient Education     Gout    Gout is an inflammation of a joint caused by an inflammatory response to gout crystals in the joint fluid. This occurs when there is excess uric acid. Uric acid is a normal waste product in the body. It builds up in the body when the kidneys can't filter enough of it from the blood. This may occur with age. It is also associated with kidney disease. Gout occurs more often in people with obesity, diabetes, high blood pressure, or high levels of fats in the blood. It may run in families. Gout tends to come and go. A flare up of gout is called an attack. Drinking alcohol or eating certain foods (such as shellfish or foods with additives such as high-fructose corn syrup) may increase uric acid levels in the blood and cause a gout attack.   During a gout attack, the affected joint may become hot, red, swollen, and painful. If you have had one attack of gout, you are likely to have another. An attack of gout can be treated with medicine. If these attacks become frequent, a daily medicine may be prescribed to help the kidneys remove uric acid from the body.   Home care  During a gout attack:    Contact your healthcare provider for advice and therapy options at the early stages of a gout flare. Treating the flare right away can prevent it from getting worse.    Rest painful joints. If gout affects the joints of your foot or leg, you may want to use crutches for the first few days to keep from bearing weight on the affected joint.    When sitting or lying down, raise the painful joint to a level higher than your heart.    Apply an ice pack (ice cubes in a plastic bag wrapped in a thin towel) over the injured area for 20 minutes every 1 to 2 hours the first day for pain  relief. Continue this 3 to 4 times a day for swelling and pain.    Avoid alcohol and foods listed below (see Preventing attacks) during a gout attack. Drink extra fluid to help flush the uric acid through your kidneys.    If you were prescribed a medicine to treat gout, take it as your healthcare provider has instructed. Don't skip doses.    Take anti-inflammatory medicine as directed by your healthcare provider.    If pain medicines have been prescribed, take them exactly as directed.    Preventing attacks    Limit or stop  alcohol use. Excess alcohol intake can cause a gout attack.    Limit these foods and beverages:  ? Organ meats, such as kidneys and liver  ? Certain seafoods (anchovies, sardines, shrimp, scallops, herring, mackerel)  ? Wild game, meat extracts and meat gravies  ? Foods and beverages sweetened with high-fructose corn syrup, such as sodas    Eat a healthy diet including low-fat and nonfat dairy, whole grains, and vegetables.    If you are overweight, talk to your healthcare provider about a weight reduction plan. Avoid fasting or extreme low calorie diets (less than 900 calories per day). This will increase uric acid levels in the body.    If you have diabetes or high blood pressure, work with your doctor to manage these conditions.    Protect the joint from injury. Wear good fitting socks and shoes. Injury can trigger a gout attack.    Follow-up care  Follow up with your healthcare provider, or as advised.   When to seek medical advice  Call your healthcare provider if you have any of the following:    Fever over 100.4 F (38. C) with worsening joint pain    Increasing redness around the joint    Pain developing in another joint    Repeated vomiting, abdominal pain, or blood in the vomit or stool (black or red color)  [employer] last reviewed this educational content on 6/1/2019 2000-2021 [employer]. All rights reserved. This information is not intended as a substitute for  professional medical care. Always follow your healthcare professional's instructions.

## 2021-10-28 ENCOUNTER — IMMUNIZATION (OUTPATIENT)
Dept: FAMILY MEDICINE | Facility: CLINIC | Age: 81
End: 2021-10-28
Payer: COMMERCIAL

## 2021-10-28 PROCEDURE — 90662 IIV NO PRSV INCREASED AG IM: CPT

## 2021-10-28 PROCEDURE — 90471 IMMUNIZATION ADMIN: CPT

## 2021-11-02 ENCOUNTER — IMMUNIZATION (OUTPATIENT)
Dept: FAMILY MEDICINE | Facility: CLINIC | Age: 81
End: 2021-11-02
Payer: COMMERCIAL

## 2021-11-02 DIAGNOSIS — Z23 HIGH PRIORITY FOR 2019-NCOV VACCINE: Primary | ICD-10-CM

## 2021-11-02 PROCEDURE — 0004A COVID-19,PF,PFIZER (12+ YRS): CPT

## 2021-11-02 PROCEDURE — 91300 COVID-19,PF,PFIZER (12+ YRS): CPT

## 2021-11-02 PROCEDURE — 99207 PR NO CHARGE LOS: CPT

## 2022-01-12 ENCOUNTER — OFFICE VISIT (OUTPATIENT)
Dept: FAMILY MEDICINE | Facility: CLINIC | Age: 82
End: 2022-01-12
Payer: COMMERCIAL

## 2022-01-12 VITALS
HEIGHT: 64 IN | SYSTOLIC BLOOD PRESSURE: 136 MMHG | HEART RATE: 90 BPM | BODY MASS INDEX: 30.56 KG/M2 | TEMPERATURE: 97.9 F | WEIGHT: 179 LBS | OXYGEN SATURATION: 97 % | RESPIRATION RATE: 16 BRPM | DIASTOLIC BLOOD PRESSURE: 84 MMHG

## 2022-01-12 DIAGNOSIS — G43.719 INTRACTABLE CHRONIC MIGRAINE WITHOUT AURA AND WITHOUT STATUS MIGRAINOSUS: ICD-10-CM

## 2022-01-12 DIAGNOSIS — E78.5 HYPERLIPIDEMIA LDL GOAL <130: ICD-10-CM

## 2022-01-12 DIAGNOSIS — Z00.00 MEDICARE ANNUAL WELLNESS VISIT, SUBSEQUENT: Primary | ICD-10-CM

## 2022-01-12 LAB
ALBUMIN SERPL-MCNC: 4.2 G/DL (ref 3.4–5)
ALP SERPL-CCNC: 59 U/L (ref 40–150)
ALT SERPL W P-5'-P-CCNC: 20 U/L (ref 0–50)
ANION GAP SERPL CALCULATED.3IONS-SCNC: 6 MMOL/L (ref 3–14)
AST SERPL W P-5'-P-CCNC: 20 U/L (ref 0–45)
BILIRUB SERPL-MCNC: 0.9 MG/DL (ref 0.2–1.3)
BUN SERPL-MCNC: 23 MG/DL (ref 7–30)
CALCIUM SERPL-MCNC: 9.2 MG/DL (ref 8.5–10.1)
CHLORIDE BLD-SCNC: 105 MMOL/L (ref 94–109)
CHOLEST SERPL-MCNC: 141 MG/DL
CO2 SERPL-SCNC: 27 MMOL/L (ref 20–32)
CREAT SERPL-MCNC: 0.85 MG/DL (ref 0.52–1.04)
FASTING STATUS PATIENT QL REPORTED: YES
GFR SERPL CREATININE-BSD FRML MDRD: 69 ML/MIN/1.73M2
GLUCOSE BLD-MCNC: 98 MG/DL (ref 70–99)
HDLC SERPL-MCNC: 53 MG/DL
LDLC SERPL CALC-MCNC: 68 MG/DL
NONHDLC SERPL-MCNC: 88 MG/DL
POTASSIUM BLD-SCNC: 4.2 MMOL/L (ref 3.4–5.3)
PROT SERPL-MCNC: 7.7 G/DL (ref 6.8–8.8)
SODIUM SERPL-SCNC: 138 MMOL/L (ref 133–144)
TRIGL SERPL-MCNC: 101 MG/DL
URATE SERPL-MCNC: 5.7 MG/DL (ref 2.6–6)

## 2022-01-12 PROCEDURE — 99214 OFFICE O/P EST MOD 30 MIN: CPT | Mod: 25 | Performed by: FAMILY MEDICINE

## 2022-01-12 PROCEDURE — 99397 PER PM REEVAL EST PAT 65+ YR: CPT | Performed by: FAMILY MEDICINE

## 2022-01-12 PROCEDURE — 80061 LIPID PANEL: CPT | Performed by: FAMILY MEDICINE

## 2022-01-12 PROCEDURE — 36415 COLL VENOUS BLD VENIPUNCTURE: CPT | Performed by: FAMILY MEDICINE

## 2022-01-12 PROCEDURE — 80053 COMPREHEN METABOLIC PANEL: CPT | Performed by: FAMILY MEDICINE

## 2022-01-12 PROCEDURE — 84550 ASSAY OF BLOOD/URIC ACID: CPT | Performed by: FAMILY MEDICINE

## 2022-01-12 RX ORDER — SIMVASTATIN 20 MG
20 TABLET ORAL AT BEDTIME
Qty: 90 TABLET | Refills: 3 | Status: SHIPPED | OUTPATIENT
Start: 2022-01-12 | End: 2023-01-17

## 2022-01-12 RX ORDER — BUTALBITAL, ACETAMINOPHEN AND CAFFEINE 50; 325; 40 MG/1; MG/1; MG/1
1-2 TABLET ORAL EVERY 4 HOURS PRN
Qty: 100 TABLET | Refills: 2 | Status: SHIPPED | OUTPATIENT
Start: 2022-01-12 | End: 2023-01-17

## 2022-01-12 RX ORDER — PROCHLORPERAZINE MALEATE 5 MG
5 TABLET ORAL 3 TIMES DAILY PRN
Qty: 30 TABLET | Refills: 2 | Status: SHIPPED | OUTPATIENT
Start: 2022-01-12 | End: 2023-01-17

## 2022-01-12 ASSESSMENT — MIFFLIN-ST. JEOR: SCORE: 1267.97

## 2022-01-12 ASSESSMENT — PAIN SCALES - GENERAL: PAINLEVEL: NO PAIN (0)

## 2022-01-12 NOTE — PROGRESS NOTES
"  Assessment & Plan     Intractable chronic migraine without aura and without status migrainosus  Stable no change in treatment plan.   - Comprehensive metabolic panel; Future  - Uric acid; Future  - butalbital-acetaminophen-caffeine (ESGIC) -40 MG tablet; Take 1-2 tablets by mouth every 4 hours as needed for pain  - prochlorperazine (COMPAZINE) 5 MG tablet; Take 1 tablet (5 mg) by mouth 3 times daily as needed for nausea (with migraine) for nausea.    Hyperlipidemia LDL goal <130  Adjust meds as indicated by above labs.   - Lipid panel reflex to direct LDL Fasting; Future  - simvastatin (ZOCOR) 20 MG tablet; Take 1 tablet (20 mg) by mouth At Bedtime    Medicare annual wellness visit, subsequent  No risk                BMI:   Estimated body mass index is 30.97 kg/m  as calculated from the following:    Height as of this encounter: 1.619 m (5' 3.75\").    Weight as of this encounter: 81.2 kg (179 lb).   Weight management plan: Discussed healthy diet and exercise guidelines    Patient Instructions   meds refilled     Labs today       Return in about 1 year (around 1/12/2023) for Routine Preventative Visit.    Heena Ny MD  [facility]    Chalo Meza is a 81 year old who presents for the following health issues     HPI     Hyperlipidemia Follow-Up      Are you regularly taking any medication or supplement to lower your cholesterol?   Yes- simvastatin     Are you having muscle aches or other side effects that you think could be caused by your cholesterol lowering medication?  No      How many servings of fruits and vegetables do you eat daily?  0-1    On average, how many sweetened beverages do you drink each day (Examples: soda, juice, sweet tea, etc.  Do NOT count diet or artificially sweetened beverages)?   0    How many days per week do you miss taking your medication? 0    Migraine     Since your last clinic visit, how have your headaches changed?  No change    How " "often are you getting headaches or migraines? Varies      Are you able to do normal daily activities when you have a migraine? Yes    Are you taking rescue/relief medications? (Select all that apply) naproxyn (Aleve)    How helpful is your rescue/relief medication?  I get some relief    Are you taking any medications to prevent migraines? (Select all that apply)  No    In the past 4 weeks, how often have you gone to urgent care or the emergency room because of your headaches?  0    Annual Wellness Visit    Patient has been advised of split billing requirements and indicates understanding: Yes     Are you in the first 12 months of your Medicare Part B coverage?  No    Physical Health:    In general, how would you rate your overall physical health? good    Outside of work, how many days during the week do you exercise?2-3 days/week    Outside of work, approximately how many minutes a day do you exercise?15-30 minutes    If you drink alcohol do you typically have >3 drinks per day or >7 drinks per week? No    Do you usually eat at least 4 servings of fruit and vegetables a day, include whole grains & fiber and avoid regularly eating high fat or \"junk\" foods? Yes    Do you have any problems taking medications regularly? No    Do you have any side effects from medications? none    Needs assistance for the following daily activities: no assistance needed    Which of the following safety concerns are present in your home?  none identified     Hearing impairment: No    In the past 6 months, have you been bothered by leaking of urine? no    Mental Health:    In general, how would you rate your overall mental or emotional health? good  PHQ-2 Score: 0    Do you feel safe in your environment? Yes    Have you ever done Advance Care Planning? (For example, a Health Directive, POLST, or a discussion with a medical provider or your loved ones about your wishes)? No, advance care planning information given to patient to review.  " Patient plans to discuss their wishes with loved ones or provider.      Fall risk:  Fallen 2 or more times in the past year?: No  Any fall with injury in the past year?: No    Cognitive Screenin) Repeat 3 items (Leader, Season, Table)    2) Clock draw: NORMAL  3) 3 item recall: Recalls 2 objects   Results: NORMAL clock, 1-2 items recalled: COGNITIVE IMPAIRMENT LESS LIKELY    Mini-CogTM Copyright S Mary. Licensed by the author for use in [facility]; reprinted with permission (elieser@[facility]). All rights reserved.      Do you have sleep apnea, excessive snoring or daytime drowsiness?: no    Current providers sharing in care for this patient include:   Patient Care Team:  Heena Ny MD as PCP - General (Family Practice)  Heena Ny MD as Assigned PCP    Patient has been advised of split billing requirements and indicates understanding: Yes   Review of Systems   Constitutional, HEENT, cardiovascular, pulmonary, gi and gu systems are negative, except as otherwise noted.      Objective    There were no vitals taken for this visit.  There is no height or weight on file to calculate BMI.  Physical Exam   GENERAL APPEARANCE: healthy, alert and no distress  RESP: lungs clear to auscultation - no rales, rhonchi or wheezes  CV: regular rates and rhythm, normal S1 S2, no S3 or S4 and no murmur, click or rub  PSYCH: mentation appears normal and affect normal/bright

## 2022-01-13 ENCOUNTER — TELEPHONE (OUTPATIENT)
Dept: FAMILY MEDICINE | Facility: CLINIC | Age: 82
End: 2022-01-13
Payer: COMMERCIAL

## 2022-01-13 NOTE — TELEPHONE ENCOUNTER
Central Prior Authorization Team   Phone: [phone]    PA Initiation    Medication: butal/acetmn/cf  Insurance Company: [employer] - Phone [phone] Fax [phone]  Pharmacy Filling the Rx: [facility] [address] [place] [address]  Filling Pharmacy Phone: [phone]  Filling Pharmacy Fax:    Start Date: 1/13/2022

## 2022-01-13 NOTE — TELEPHONE ENCOUNTER
Prior Authorization Retail Medication Request    Medication/Dose: butal/acetamn/cf   ICD code (if different than what is on RX):  Intractable chronic migraine without aura and without status migrainosus [G43.719]   Previously Tried and Failed:    Rationale:  Stable no change in treatment plan.   - Comprehensive metabolic panel; Future  - Uric acid; Future  - butalbital-acetaminophen-caffeine (ESGIC) -40 MG tablet; Take 1-2 tablets by mouth every 4 hours as needed for pain  - prochlorperazine (COMPAZINE) 5 MG tablet; Take 1 tablet (5 mg) by mouth 3 times daily as needed for nausea (with migraine) for nausea.       Insurance Name:  Medicare-rx D [facility]  Insurance ID:  [id number]      Pharmacy Information (if different than what is on RX)  Name:     Phone:

## 2022-01-14 NOTE — TELEPHONE ENCOUNTER
Prior Authorization Approval    Authorization Effective Date: 12/14/2021  Authorization Expiration Date: 1/13/2023  Medication: butal/acetmn/cf  Approved Dose/Quantity:   Reference #:     Insurance Company: [employer] - Phone [phone] Fax [phone]  Expected CoPay:       CoPay Card Available:      Foundation Assistance Needed:    Which Pharmacy is filling the prescription (Not needed for infusion/clinic administered): [facility] [id number] [facility] [address] [place] [address]  Pharmacy Notified: Yes  Patient Notified: Yes

## 2022-04-22 ENCOUNTER — IMMUNIZATION (OUTPATIENT)
Dept: FAMILY MEDICINE | Facility: CLINIC | Age: 82
End: 2022-04-22
Payer: COMMERCIAL

## 2022-04-22 PROCEDURE — 0054A COVID-19,PF,PFIZER (12+ YRS): CPT

## 2022-04-22 PROCEDURE — 91305 COVID-19,PF,PFIZER (12+ YRS): CPT

## 2022-10-16 ENCOUNTER — HEALTH MAINTENANCE LETTER (OUTPATIENT)
Age: 82
End: 2022-10-16

## 2022-10-24 ENCOUNTER — IMMUNIZATION (OUTPATIENT)
Dept: FAMILY MEDICINE | Facility: CLINIC | Age: 82
End: 2022-10-24
Payer: COMMERCIAL

## 2022-10-24 PROCEDURE — G0008 ADMIN INFLUENZA VIRUS VAC: HCPCS

## 2022-10-24 PROCEDURE — 90662 IIV NO PRSV INCREASED AG IM: CPT

## 2022-11-02 ENCOUNTER — IMMUNIZATION (OUTPATIENT)
Dept: FAMILY MEDICINE | Facility: CLINIC | Age: 82
End: 2022-11-02
Payer: COMMERCIAL

## 2022-11-02 PROCEDURE — 0124A COVID-19,PF,PFIZER BOOSTER BIVALENT: CPT

## 2022-11-02 PROCEDURE — 91312 COVID-19,PF,PFIZER BOOSTER BIVALENT: CPT

## 2023-01-17 ENCOUNTER — OFFICE VISIT (OUTPATIENT)
Dept: FAMILY MEDICINE | Facility: CLINIC | Age: 83
End: 2023-01-17
Payer: COMMERCIAL

## 2023-01-17 VITALS
HEART RATE: 85 BPM | RESPIRATION RATE: 12 BRPM | BODY MASS INDEX: 29.88 KG/M2 | HEIGHT: 64 IN | DIASTOLIC BLOOD PRESSURE: 78 MMHG | OXYGEN SATURATION: 98 % | TEMPERATURE: 97 F | SYSTOLIC BLOOD PRESSURE: 130 MMHG | WEIGHT: 175 LBS

## 2023-01-17 DIAGNOSIS — G43.719 INTRACTABLE CHRONIC MIGRAINE WITHOUT AURA AND WITHOUT STATUS MIGRAINOSUS: ICD-10-CM

## 2023-01-17 DIAGNOSIS — E78.5 HYPERLIPIDEMIA LDL GOAL <130: ICD-10-CM

## 2023-01-17 DIAGNOSIS — Z00.00 MEDICARE ANNUAL WELLNESS VISIT, SUBSEQUENT: Primary | ICD-10-CM

## 2023-01-17 LAB
ALBUMIN SERPL BCG-MCNC: 4.4 G/DL (ref 3.5–5.2)
ALP SERPL-CCNC: 56 U/L (ref 35–104)
ALT SERPL W P-5'-P-CCNC: 16 U/L (ref 10–35)
ANION GAP SERPL CALCULATED.3IONS-SCNC: 12 MMOL/L (ref 7–15)
AST SERPL W P-5'-P-CCNC: 24 U/L (ref 10–35)
BILIRUB SERPL-MCNC: 0.5 MG/DL
BUN SERPL-MCNC: 20.5 MG/DL (ref 8–23)
CALCIUM SERPL-MCNC: 9.9 MG/DL (ref 8.8–10.2)
CHLORIDE SERPL-SCNC: 103 MMOL/L (ref 98–107)
CHOLEST SERPL-MCNC: 140 MG/DL
CREAT SERPL-MCNC: 0.92 MG/DL (ref 0.51–0.95)
DEPRECATED HCO3 PLAS-SCNC: 27 MMOL/L (ref 22–29)
ERYTHROCYTE [DISTWIDTH] IN BLOOD BY AUTOMATED COUNT: 12.4 % (ref 10–15)
GFR SERPL CREATININE-BSD FRML MDRD: 63 ML/MIN/1.73M2
GLUCOSE SERPL-MCNC: 93 MG/DL (ref 70–99)
HCT VFR BLD AUTO: 42.5 % (ref 35–47)
HDLC SERPL-MCNC: 49 MG/DL
HGB BLD-MCNC: 14.4 G/DL (ref 11.7–15.7)
LDLC SERPL CALC-MCNC: 71 MG/DL
MCH RBC QN AUTO: 31.8 PG (ref 26.5–33)
MCHC RBC AUTO-ENTMCNC: 33.9 G/DL (ref 31.5–36.5)
MCV RBC AUTO: 94 FL (ref 78–100)
NONHDLC SERPL-MCNC: 91 MG/DL
PLATELET # BLD AUTO: 207 10E3/UL (ref 150–450)
POTASSIUM SERPL-SCNC: 4.1 MMOL/L (ref 3.4–5.3)
PROT SERPL-MCNC: 7.3 G/DL (ref 6.4–8.3)
RBC # BLD AUTO: 4.53 10E6/UL (ref 3.8–5.2)
SODIUM SERPL-SCNC: 142 MMOL/L (ref 136–145)
TRIGL SERPL-MCNC: 99 MG/DL
WBC # BLD AUTO: 4.6 10E3/UL (ref 4–11)

## 2023-01-17 PROCEDURE — 85027 COMPLETE CBC AUTOMATED: CPT | Performed by: FAMILY MEDICINE

## 2023-01-17 PROCEDURE — 36415 COLL VENOUS BLD VENIPUNCTURE: CPT | Performed by: FAMILY MEDICINE

## 2023-01-17 PROCEDURE — 80053 COMPREHEN METABOLIC PANEL: CPT | Performed by: FAMILY MEDICINE

## 2023-01-17 PROCEDURE — 80061 LIPID PANEL: CPT | Performed by: FAMILY MEDICINE

## 2023-01-17 PROCEDURE — 99214 OFFICE O/P EST MOD 30 MIN: CPT | Mod: 25 | Performed by: FAMILY MEDICINE

## 2023-01-17 PROCEDURE — G0439 PPPS, SUBSEQ VISIT: HCPCS | Performed by: FAMILY MEDICINE

## 2023-01-17 RX ORDER — SIMVASTATIN 20 MG
20 TABLET ORAL AT BEDTIME
Qty: 90 TABLET | Refills: 3 | Status: SHIPPED | OUTPATIENT
Start: 2023-01-17 | End: 2024-01-09

## 2023-01-17 RX ORDER — BUTALBITAL, ACETAMINOPHEN AND CAFFEINE 50; 325; 40 MG/1; MG/1; MG/1
1-2 TABLET ORAL EVERY 4 HOURS PRN
Qty: 100 TABLET | Refills: 2 | Status: SHIPPED | OUTPATIENT
Start: 2023-01-17 | End: 2024-01-09

## 2023-01-17 RX ORDER — PROCHLORPERAZINE MALEATE 5 MG
5 TABLET ORAL 3 TIMES DAILY PRN
Qty: 30 TABLET | Refills: 2 | Status: SHIPPED | OUTPATIENT
Start: 2023-01-17 | End: 2024-01-09

## 2023-01-17 ASSESSMENT — PAIN SCALES - GENERAL: PAINLEVEL: NO PAIN (0)

## 2023-01-17 NOTE — PROGRESS NOTES
"  Assessment & Plan     Hyperlipidemia LDL goal <130  Adjust meds as indicated by above labs.   - simvastatin (ZOCOR) 20 MG tablet; Take 1 tablet (20 mg) by mouth At Bedtime  - Lipid panel reflex to direct LDL Fasting; Future  - Lipid panel reflex to direct LDL Fasting    Intractable chronic migraine without aura and without status migrainosus  Stable no change in treatment plan.   - prochlorperazine (COMPAZINE) 5 MG tablet; Take 1 tablet (5 mg) by mouth 3 times daily as needed for nausea (with migraine) for nausea.  - butalbital-acetaminophen-caffeine (ESGIC) -40 MG tablet; Take 1-2 tablets by mouth every 4 hours as needed for pain  - CBC with platelets; Future  - Comprehensive metabolic panel; Future  - CBC with platelets  - Comprehensive metabolic panel    Medicare annual wellness visit, subsequent  No risk                BMI:   Estimated body mass index is 30.51 kg/m  as calculated from the following:    Height as of this encounter: 1.613 m (5' 3.5\").    Weight as of this encounter: 79.4 kg (175 lb).   Weight management plan: Discussed healthy diet and exercise guidelines        Return in about 1 year (around 1/17/2024) for Routine Preventative Visit.    Heena Ny MD  [facility]    Chalo Meza is a 82 year old, presenting for the following health issues:  Lipids and Headache (/)      History of Present Illness       Reason for visit:  Med check    She eats 2-3 servings of fruits and vegetables daily.She consumes 1 sweetened beverage(s) daily.She exercises with enough effort to increase her heart rate 9 or less minutes per day.  She exercises with enough effort to increase her heart rate 3 or less days per week.   She is taking medications regularly.       Hyperlipidemia Follow-Up      Are you regularly taking any medication or supplement to lower your cholesterol?   Yes- Simvastatin    Are you having muscle aches or other side effects that you think could be " "caused by your cholesterol lowering medication?  No    Migraine     Since your last clinic visit, how have your headaches changed?  No change    How often are you getting headaches or migraines? No pattern     Are you able to do normal daily activities when you have a migraine? Yes    Are you taking rescue/relief medications? (Select all that apply) Other: ESGIG    How helpful is your rescue/relief medication?  I get total relief    Are you taking any medications to prevent migraines? (Select all that apply)  No    In the past 4 weeks, how often have you gone to urgent care or the emergency room because of your headaches?  0    Annual Wellness Visit    Patient has been advised of split billing requirements and indicates understanding: Yes     Are you in the first 12 months of your Medicare Part B coverage?  No    Physical Health:    In general, how would you rate your overall physical health? good    Outside of work, how many days during the week do you exercise?2-3 days/week more in the summer time    Outside of work, approximately how many minutes a day do you exercise?15-30 minutes    If you drink alcohol do you typically have >3 drinks per day or >7 drinks per week? No    Do you usually eat at least 4 servings of fruit and vegetables a day, include whole grains & fiber and avoid regularly eating high fat or \"junk\" foods? Yes    Do you have any problems taking medications regularly? No    Do you have any side effects from medications? none    Needs assistance for the following daily activities: no assistance needed    Which of the following safety concerns are present in your home?  none identified     Hearing impairment: No    In the past 6 months, have you been bothered by leaking of urine? no    Mental Health:    In general, how would you rate your overall mental or emotional health? good  PHQ-2 Score: 0    Do you feel safe in your environment? Yes    Have you ever done Advance Care Planning? (For example, a " "Health Directive, POLST, or a discussion with a medical provider or your loved ones about your wishes)? Yes, advance care planning is on file.    Fall risk:  Fallen 2 or more times in the past year?: No  Any fall with injury in the past year?: No    Cognitive Screening: pt refused     Do you have sleep apnea, excessive snoring or daytime drowsiness?: no    Current providers sharing in care for this patient include:   Patient Care Team:  Heena Ny MD as PCP - General (Family Practice)  Heena Ny MD as Assigned PCP    Patient has been advised of split billing requirements and indicates understanding: Yes    Review of Systems   Constitutional, HEENT, cardiovascular, pulmonary, gi and gu systems are negative, except as otherwise noted.      Objective    BP (!) 150/84   Pulse 85   Temp 97  F (36.1  C) (Tympanic)   Resp 12   Ht 1.613 m (5' 3.5\")   Wt 79.4 kg (175 lb)   SpO2 98%   BMI 30.51 kg/m    Body mass index is 30.51 kg/m .  Physical Exam   GENERAL APPEARANCE: healthy, alert and no distress  NECK: no adenopathy, no asymmetry, masses, or scars and thyroid normal to palpation  RESP: lungs clear to auscultation - no rales, rhonchi or wheezes  CV: regular rates and rhythm, normal S1 S2, no S3 or S4 and no murmur, click or rub  ABDOMEN: soft, nontender, without hepatosplenomegaly or masses and bowel sounds normal  MS: extremities normal- no gross deformities noted  SKIN: no suspicious lesions or rashes  PSYCH: mentation appears normal and affect normal/bright                    "

## 2023-01-17 NOTE — NURSING NOTE
"Chief Complaint   Patient presents with     Lipids     Headache            BP (!) 150/84   Pulse 85   Temp 97  F (36.1  C) (Tympanic)   Resp 12   Ht 1.613 m (5' 3.5\")   Wt 79.4 kg (175 lb)   SpO2 98%   BMI 30.51 kg/m   Estimated body mass index is 30.51 kg/m  as calculated from the following:    Height as of this encounter: 1.613 m (5' 3.5\").    Weight as of this encounter: 79.4 kg (175 lb).  Patient presents to the clinic using No DME      Health Maintenance that is potentially due pending provider review:    Health Maintenance Due   Topic Date Due     ANNUAL REVIEW OF HM ORDERS  Never done     ZOSTER IMMUNIZATION (2 of 3) 08/13/2008     DTAP/TDAP/TD IMMUNIZATION (2 - Td or Tdap) 04/01/2015     MEDICARE ANNUAL WELLNESS VISIT  01/12/2023        n/a        "

## 2023-07-13 ENCOUNTER — OFFICE VISIT (OUTPATIENT)
Dept: URGENT CARE | Facility: URGENT CARE | Age: 83
End: 2023-07-13
Payer: COMMERCIAL

## 2023-07-13 VITALS
OXYGEN SATURATION: 99 % | WEIGHT: 170 LBS | DIASTOLIC BLOOD PRESSURE: 71 MMHG | SYSTOLIC BLOOD PRESSURE: 160 MMHG | BODY MASS INDEX: 29.64 KG/M2 | TEMPERATURE: 97.4 F | HEART RATE: 80 BPM

## 2023-07-13 DIAGNOSIS — B02.9 HERPES ZOSTER WITHOUT COMPLICATION: Primary | ICD-10-CM

## 2023-07-13 PROCEDURE — 99213 OFFICE O/P EST LOW 20 MIN: CPT

## 2023-07-13 RX ORDER — ACYCLOVIR 800 MG/1
800 TABLET ORAL 3 TIMES DAILY
Qty: 21 TABLET | Refills: 0 | Status: SHIPPED | OUTPATIENT
Start: 2023-07-13 | End: 2023-07-20

## 2023-07-13 NOTE — PATIENT INSTRUCTIONS
Diagnosis: Shingles / herpes zoster   Today we did:  xx     Plan:   Antiviral medications (acyclovir, valacyclovir)  - reduce symptoms, shorten duration of illness, not a cure   Need to start w/in 3 days of rash onset   Reduce pain- ibuprofen or tylenol- helps w/ pain, and to decrease inflammation   Reduce itching- benadryl, hydrocortisone cream, cool compresses   Pain and tingling can last for 30 days even after rash heals - postherpetic neuralgia     Monitor for:   Fever of 101 F  Affected skin is on the face or neck and any of the following occur:  Headache  Eye pain  Changes in vision  Sores near the eye  Weakness of facial muscles  Blisters occurring on new areas of the body  Pain, redness, or swelling of a joint  Signs of skin infection: colored drainage from the sores, warmth, increasing redness, fever, or increasing pain  A rash or blisters near your eye  More drainage, fever, or rash after treatment  Severe pain that doesn't go away        Shingles (Herpes Zoster)  Shingles is also called herpes zoster. It's a painful skin rash caused by the herpes zoster virus.   This is the same virus that causes chickenpox. After a person has chickenpox, the virus stays inactive in the nerve cells.   Years later, the virus can become active again and travel along the nerve to the skin.   Most people have shingles only once. But it's possible to have it more than once.    Shingles is a viral infection.   Shingles often occurs in older persons or persons with lowered immunity, and in people who's immunity is lower due to stress.  But it can affect anyone at any age.  Symptoms:   Shingles starts as a tingling patch of skin on one side of the body.   Small, painful blisters may then appear.   The rash rarely spreads to other parts of the body.  Exposure to shingles can't cause shingles- it is not contagious    However, it can cause chicken pox in anyone who has not had chicken pox or has not been vaccinated.   The contagious  period ends when all blisters have crusted over, generally 1 to 2 weeks after the illness starts.  After the blisters heal, the affected skin may be sensitive or painful for weeks or months, gradually resolving over time.   But, sometimes this can last longer and be permanent (called postherpetic neuralgia.)  complications  Shingles often goes away with no lasting effects. But some people have complications during or after the infection comes out:  Postherpetic neuralgia. This is the most common complication. It's more likely as people age, especially after age 60. It' is nerve pain at the place where the rash used to be. It can range from mild to severe. It can last for only a few days, or for months or even years after you have had shingles. Antiviral medicines given during the first 72 hours of the rash can reduce the chance of postherpetic neuralgia. Other medicines can be prescribed to help ease the pain and improve quality of life.  Bacterial infection. Shingles blisters may get infected with bacteria. Depending on the severity of the infection, topical, oral or IV (intravenous) antibiotic medicine is used to treat the infection.  Eye problems. If you have shingles on the face, see your healthcare provider right away. Shingles can cause serious problems with vision, and even blindness.  The shingles vaccine  The recombinant zoster vaccine (RZV) shingles vaccine is available to help prevent shingles or make it less painful.  You should get the RZV shingles vaccine if you are healthy and age 50 or older, even if you've had shingles in the past. Two shots of the RZV vaccine are recommended. You should get the second RZV shot 2 to 6 months after the first. The vaccine makes it less likely that you will develop shingles. If you do develop shingles, your symptoms will likely be milder than if you hadn't been vaccinated. RZV is also advised even if you had the older shingles vaccine (zoster live vaccine, ZVL) in the  past. That's because the RZV vaccine works better and protects you from shingles longer.  Talk with your PCP about the best time to get vaccinated

## 2023-07-13 NOTE — PROGRESS NOTES
URGENT CARE  Assessment & Plan   Assessment:   Susana Duenas is a 82 year old female who's clinical presentation today is consistent with:   1. Herpes zoster without complication  - acyclovir (ZOVIRAX) 800 MG tablet; Take 1 tablet (800 mg) by mouth 3 times daily for 7 days  Dispense: 21 tablet; Refill: 0    Plan:  Given patient's presentation of a unilateral painful vesicular dermatomal skin eruption with well-defined demarcation linear distribution will clinically diagnose patient with Herpes Zoster and treat patient's shingles with antivirals at this time, side effects of medications reviewed.   Discussed w/ patient that antivirals reduce the viral replication and shorten the duration of viral shedding, stop formation of new lesions, can help prevent further complications, and can reduce the severity of the pain  -educated patient the treatment is best if started w/in 72 hrs after rash onset    Educated patient he can use a topical analgesia or hydrocortisone cream as needed for itichign and pain, along with Tylenol /NSAIDs to help w/ pain management during the acute phase of symptoms. Educated patient on postherpetic neuralgia possibility and that pain can last/persist >30 days after rash heals. We discussed monitoring for secondary bacteria superinfections at the site along with new neurological complications.    Educated patient to keep rash clean and dry to reduce the risk of bacterial superinfection, educated patient to avoid any irritants to the site such as dressings, adhesives, or irritative fiber clothing  Additionally we discussed if symptoms do not improve after starting today's treatment plan (or if symptoms worsen) to follow up in 7-10 days.  No alarm signs or symptoms present   Differential Diagnoses for this patient's chief complaint that I considered include:  Atopic or contact dermatitis, allergic Dermatitis,drug reaction, Insect bite/sting, Psoriasis, scabies, tinea, seborrheic  "dermatitis/keratosis, Herpes simplex, ulcerative keratitis,  Monkey box, neurologic complications, bacterial sequelae/secondary superinfection    Patient is} agreeable to treatment plan and state they will follow-up if symptoms do not improve and/or if symptoms worsen   see patient's AVS 'monitor for' section for specific patient instructions given and discussed regarding what to watch for and when to follow up    IBRAHIMA Murguia [facility]      ______________________________________________________________________      Subjective     HPI: Susana Duenas  is a 82 year old  female who presents today for evaluation the following concerns:   Patient presents today with a rash noted to the right side of her low back, patient states rash started 2 days ago on 7/11/23.   Patient states the rash is red and vesicular and seems to be linear running along their back right side. Patient  states the rash is slightly itchy, but is very painful. Patient states the rash is not draining and is not bleeding.   Patient denies any systemic symptoms such as headache, fever, malaise or fatigue.   Patient denies that the rash is located on any mucus membranes.   Patient endorses having prodromal pain, noted as \"stinging\" at the site about 2-3 days before the rash eruption   Patient denies having shingles in the past.       Review of Systems:  Pertinent review of systems as reflected in HPI, otherwise negative.     Objective    Physical Exam:  Vitals:    07/13/23 1244   BP: (!) 160/71   Pulse: 80   Temp: 97.4  F (36.3  C)   TempSrc: Tympanic   SpO2: 99%   Weight: 77.1 kg (170 lb)      General:   alert and oriented, no acute distress, non ill-appearing   Vital signs reviewed: afebrile   Psy/mental status: pleasant   SKIN:   Posterior back, right side :    targeted, raised, erythematous rash noted with clear vesicles, and demarcation    The rash has distribution on the back and follows the area of the " L3/4  dermatome. - unilateral, Rash does not cross midline.   No current signs of bacterial superinfection infection present.  No Pustulation noted, no crust formation  Painful to palpation, non excoriations noted   No numbness to right leg distally from rash       I explained my diagnostic considerations and recommendations to the patient, who voiced understanding and agreement with the treatment plan.   All questions were answered.   We discussed potential side effects, risks and benefits of any prescribed or recommended therapies, as well as expectations for response to treatments.  Please see AVS for any patient instructions & handouts given.   Patient was advised to contact the Nurse Care Line, their Primary Care provider, Urgent Care, or the Emergency Department if there are new or worsening symptoms, or call 911 for emergencies.    ______________________________________________________________________

## 2023-10-24 ENCOUNTER — IMMUNIZATION (OUTPATIENT)
Dept: FAMILY MEDICINE | Facility: CLINIC | Age: 83
End: 2023-10-24
Payer: COMMERCIAL

## 2023-10-24 DIAGNOSIS — Z23 NEED FOR PROPHYLACTIC VACCINATION AND INOCULATION AGAINST INFLUENZA: Primary | ICD-10-CM

## 2023-10-24 PROCEDURE — 90471 IMMUNIZATION ADMIN: CPT

## 2023-10-24 PROCEDURE — 90662 IIV NO PRSV INCREASED AG IM: CPT

## 2024-01-04 ASSESSMENT — ENCOUNTER SYMPTOMS
JOINT SWELLING: 0
SHORTNESS OF BREATH: 0
HEARTBURN: 0
MYALGIAS: 0
FEVER: 0
DIZZINESS: 0
CONSTIPATION: 0
NERVOUS/ANXIOUS: 0
HEADACHES: 0
CHILLS: 0
WEAKNESS: 0
COUGH: 0
EYE PAIN: 0
PARESTHESIAS: 0
ARTHRALGIAS: 0
BREAST MASS: 0
HEMATOCHEZIA: 0
PALPITATIONS: 0
ABDOMINAL PAIN: 0
HEMATURIA: 0
DYSURIA: 0
FREQUENCY: 0
SORE THROAT: 0
NAUSEA: 0
DIARRHEA: 0

## 2024-01-04 ASSESSMENT — ACTIVITIES OF DAILY LIVING (ADL): CURRENT_FUNCTION: NO ASSISTANCE NEEDED

## 2024-01-09 ENCOUNTER — OFFICE VISIT (OUTPATIENT)
Dept: FAMILY MEDICINE | Facility: CLINIC | Age: 84
End: 2024-01-09
Payer: COMMERCIAL

## 2024-01-09 VITALS
TEMPERATURE: 96.9 F | HEART RATE: 76 BPM | DIASTOLIC BLOOD PRESSURE: 82 MMHG | BODY MASS INDEX: 30.65 KG/M2 | HEIGHT: 63 IN | SYSTOLIC BLOOD PRESSURE: 130 MMHG | WEIGHT: 173 LBS | OXYGEN SATURATION: 99 % | RESPIRATION RATE: 16 BRPM

## 2024-01-09 DIAGNOSIS — Z00.00 ENCOUNTER FOR MEDICARE ANNUAL WELLNESS EXAM: Primary | ICD-10-CM

## 2024-01-09 DIAGNOSIS — G43.719 INTRACTABLE CHRONIC MIGRAINE WITHOUT AURA AND WITHOUT STATUS MIGRAINOSUS: ICD-10-CM

## 2024-01-09 DIAGNOSIS — E78.5 HYPERLIPIDEMIA LDL GOAL <130: ICD-10-CM

## 2024-01-09 LAB
ALBUMIN SERPL BCG-MCNC: 4.7 G/DL (ref 3.5–5.2)
ALP SERPL-CCNC: 62 U/L (ref 40–150)
ALT SERPL W P-5'-P-CCNC: 15 U/L (ref 0–50)
ANION GAP SERPL CALCULATED.3IONS-SCNC: 13 MMOL/L (ref 7–15)
AST SERPL W P-5'-P-CCNC: 26 U/L (ref 0–45)
BILIRUB SERPL-MCNC: 0.4 MG/DL
BUN SERPL-MCNC: 21.1 MG/DL (ref 8–23)
CALCIUM SERPL-MCNC: 9.9 MG/DL (ref 8.8–10.2)
CHLORIDE SERPL-SCNC: 104 MMOL/L (ref 98–107)
CHOLEST SERPL-MCNC: 165 MG/DL
CREAT SERPL-MCNC: 0.78 MG/DL (ref 0.51–0.95)
DEPRECATED HCO3 PLAS-SCNC: 27 MMOL/L (ref 22–29)
EGFRCR SERPLBLD CKD-EPI 2021: 76 ML/MIN/1.73M2
ERYTHROCYTE [DISTWIDTH] IN BLOOD BY AUTOMATED COUNT: 12.7 % (ref 10–15)
FASTING STATUS PATIENT QL REPORTED: NORMAL
GLUCOSE SERPL-MCNC: 92 MG/DL (ref 70–99)
HCT VFR BLD AUTO: 45.3 % (ref 35–47)
HDLC SERPL-MCNC: 64 MG/DL
HGB BLD-MCNC: 14.7 G/DL (ref 11.7–15.7)
LDLC SERPL CALC-MCNC: 83 MG/DL
MCH RBC QN AUTO: 31.3 PG (ref 26.5–33)
MCHC RBC AUTO-ENTMCNC: 32.5 G/DL (ref 31.5–36.5)
MCV RBC AUTO: 96 FL (ref 78–100)
NONHDLC SERPL-MCNC: 101 MG/DL
PLATELET # BLD AUTO: 190 10E3/UL (ref 150–450)
POTASSIUM SERPL-SCNC: 4.4 MMOL/L (ref 3.4–5.3)
PROT SERPL-MCNC: 7.7 G/DL (ref 6.4–8.3)
RBC # BLD AUTO: 4.7 10E6/UL (ref 3.8–5.2)
SODIUM SERPL-SCNC: 144 MMOL/L (ref 135–145)
TRIGL SERPL-MCNC: 92 MG/DL
WBC # BLD AUTO: 4.6 10E3/UL (ref 4–11)

## 2024-01-09 PROCEDURE — 36415 COLL VENOUS BLD VENIPUNCTURE: CPT | Performed by: FAMILY MEDICINE

## 2024-01-09 PROCEDURE — 80053 COMPREHEN METABOLIC PANEL: CPT | Performed by: FAMILY MEDICINE

## 2024-01-09 PROCEDURE — 80061 LIPID PANEL: CPT | Performed by: FAMILY MEDICINE

## 2024-01-09 PROCEDURE — 99214 OFFICE O/P EST MOD 30 MIN: CPT | Mod: 25 | Performed by: FAMILY MEDICINE

## 2024-01-09 PROCEDURE — 85027 COMPLETE CBC AUTOMATED: CPT | Performed by: FAMILY MEDICINE

## 2024-01-09 PROCEDURE — G0439 PPPS, SUBSEQ VISIT: HCPCS | Performed by: FAMILY MEDICINE

## 2024-01-09 RX ORDER — SIMVASTATIN 20 MG
20 TABLET ORAL AT BEDTIME
Qty: 90 TABLET | Refills: 3 | Status: SHIPPED | OUTPATIENT
Start: 2024-01-09

## 2024-01-09 RX ORDER — RESPIRATORY SYNCYTIAL VIRUS VACCINE 120MCG/0.5
0.5 KIT INTRAMUSCULAR ONCE
Qty: 1 EACH | Refills: 0 | Status: CANCELLED | OUTPATIENT
Start: 2024-01-09 | End: 2024-01-09

## 2024-01-09 RX ORDER — BUTALBITAL, ACETAMINOPHEN AND CAFFEINE 50; 325; 40 MG/1; MG/1; MG/1
1-2 TABLET ORAL EVERY 4 HOURS PRN
Qty: 100 TABLET | Refills: 2 | Status: SHIPPED | OUTPATIENT
Start: 2024-01-09

## 2024-01-09 ASSESSMENT — ENCOUNTER SYMPTOMS
DYSURIA: 0
BREAST MASS: 0
DIARRHEA: 0
MYALGIAS: 0
WEAKNESS: 0
ABDOMINAL PAIN: 0
SHORTNESS OF BREATH: 0
PALPITATIONS: 0
EYE PAIN: 0
CONSTIPATION: 0
JOINT SWELLING: 0
ARTHRALGIAS: 0
HEARTBURN: 0
FEVER: 0
CHILLS: 0
SORE THROAT: 0
NAUSEA: 0
FREQUENCY: 0
COUGH: 0
HEMATOCHEZIA: 0
NERVOUS/ANXIOUS: 0
HEADACHES: 0
PARESTHESIAS: 0
DIZZINESS: 0
HEMATURIA: 0

## 2024-01-09 ASSESSMENT — PAIN SCALES - GENERAL: PAINLEVEL: NO PAIN (0)

## 2024-01-09 ASSESSMENT — ACTIVITIES OF DAILY LIVING (ADL): CURRENT_FUNCTION: NO ASSISTANCE NEEDED

## 2024-01-09 NOTE — PROGRESS NOTES
"SUBJECTIVE:   Susana is a 83 year old, presenting for the following:  Medicare Visit        1/9/2024    11:05 AM   Additional Questions   Roomed by Noreen SIMEON   Accompanied by Self         1/9/2024    11:05 AM   Patient Reported Additional Medications   Patient reports taking the following new medications .       Are you in the first 12 months of your Medicare coverage?  No    Healthy Habits:     In general, how would you rate your overall health?  Good    Frequency of exercise:  2-3 days/week    Duration of exercise:  N/A    Do you usually eat at least 4 servings of fruit and vegetables a day, include whole grains    & fiber and avoid regularly eating high fat or \"junk\" foods?  Yes    Taking medications regularly:  Yes    Medication side effects:  None    Ability to successfully perform activities of daily living:  No assistance needed    Home Safety:  No safety concerns identified    Hearing Impairment:  No hearing concerns    In the past 6 months, have you been bothered by leaking of urine?  No    In general, how would you rate your overall mental or emotional health?  Excellent    Additional concerns today:  No          Have you ever done Advance Care Planning? (For example, a Health Directive, POLST, or a discussion with a medical provider or your loved ones about your wishes): Yes, advance care planning is on file.       Fall risk  Fallen 2 or more times in the past year?: No  Any fall with injury in the past year?: No    Cognitive Screening   1) Repeat 3 items (Leader, Season, Table)    2) Clock draw:   3) 3 item recall:   Results: Pt declined    Mini-CogTM Copyright TYRELL Hernandez. Licensed by the author for use in [facility]; reprinted with permission ([email or web address]@.[facility]). All rights reserved.      Do you have sleep apnea, excessive snoring or daytime drowsiness? : no    Reviewed and updated as needed this visit by clinical staff   Tobacco  Allergies  Meds              Reviewed and updated as needed this " visit by Provider                 Social History     Tobacco Use    Smoking status: Former     Packs/day: 0.50     Years: 15.00     Additional pack years: 0.00     Total pack years: 7.50     Types: Cigarettes     Quit date: 1987     Years since quittin.1    Smokeless tobacco: Never    Tobacco comments:     quit 20 years ago.  Smoked off and on for 20 years   Substance Use Topics    Alcohol use: No             2024    10:41 AM   Alcohol Use   Prescreen: >3 drinks/day or >7 drinks/week? Not Applicable     Do you have a current opioid prescription? No  Do you use any other controlled substances or medications that are not prescribed by a provider? None      Migraine   Since your last clinic visit, how have your headaches changed?  No change  How often are you getting headaches or migraines? Occasional    Are you able to do normal daily activities when you have a migraine? Yes  Are you taking rescue/relief medications? (Select all that apply) Other: butalbitol   How helpful is your rescue/relief medication?  I get total relief  Are you taking any medications to prevent migraines? (Select all that apply)  No  In the past 4 weeks, how often have you gone to urgent care or the emergency room because of your headaches?  0      Hyperlipidemia Follow-Up    Are you regularly taking any medication or supplement to lower your cholesterol?   Yes- zocor  Are you having muscle aches or other side effects that you think could be caused by your cholesterol lowering medication?  No    Current providers sharing in care for this patient include:   Patient Care Team:  Heena Ny MD as PCP - General (Family Practice)  Heena Ny MD as Assigned PCP    The following health maintenance items are reviewed in Epic and correct as of today:  Health Maintenance   Topic Date Due    RSV VACCINE (Pregnancy & 60+) (1 - 1-dose 60+ series) Never done    DTAP/TDAP/TD IMMUNIZATION (1 - Tdap) 2005    ZOSTER  "IMMUNIZATION (2 of 3) 08/13/2008    MEDICARE ANNUAL WELLNESS VISIT  01/17/2024    ANNUAL REVIEW OF HM ORDERS  01/17/2024    FALL RISK ASSESSMENT  01/09/2025    ADVANCE CARE PLANNING  01/17/2028    DEXA  12/01/2031    MIGRAINE ACTION PLAN  Completed    PHQ-2 (once per calendar year)  Completed    INFLUENZA VACCINE  Completed    Pneumococcal Vaccine: 65+ Years  Completed    COVID-19 Vaccine  Completed    IPV IMMUNIZATION  Aged Out    HPV IMMUNIZATION  Aged Out    MENINGITIS IMMUNIZATION  Aged Out    RSV MONOCLONAL ANTIBODY  Aged Out     Labs reviewed in EPIC        Pertinent mammograms are reviewed under the imaging tab.    Review of Systems   Constitutional:  Negative for chills and fever.   HENT:  Negative for congestion, ear pain, hearing loss and sore throat.    Eyes:  Negative for pain and visual disturbance.   Respiratory:  Negative for cough and shortness of breath.    Cardiovascular:  Negative for chest pain, palpitations and peripheral edema.   Gastrointestinal:  Negative for abdominal pain, constipation, diarrhea, heartburn, hematochezia and nausea.   Breasts:  Negative for tenderness, breast mass and discharge.   Genitourinary:  Negative for dysuria, frequency, genital sores, hematuria, pelvic pain, urgency, vaginal bleeding and vaginal discharge.   Musculoskeletal:  Negative for arthralgias, joint swelling and myalgias.   Skin:  Negative for rash.   Neurological:  Negative for dizziness, weakness, headaches and paresthesias.   Psychiatric/Behavioral:  Negative for mood changes. The patient is not nervous/anxious.          OBJECTIVE:   BP (!) 136/96   Pulse 76   Temp 96.9  F (36.1  C) (Tympanic)   Resp 16   Ht 1.6 m (5' 3\")   Wt 78.5 kg (173 lb)   SpO2 99%   BMI 30.65 kg/m   Estimated body mass index is 30.65 kg/m  as calculated from the following:    Height as of this encounter: 1.6 m (5' 3\").    Weight as of this encounter: 78.5 kg (173 lb).  Physical Exam  GENERAL: healthy, alert and no " "distress  EYES: Eyes grossly normal to inspection, PERRL and conjunctivae and sclerae normal  HENT: ear canals and TM's normal, nose and mouth without ulcers or lesions  NECK: no adenopathy, no asymmetry, masses, or scars and thyroid normal to palpation  RESP: lungs clear to auscultation - no rales, rhonchi or wheezes  CV: regular rate and rhythm, normal S1 S2, no S3 or S4, no murmur, click or rub, no peripheral edema and peripheral pulses strong  ABDOMEN: soft, nontender, no hepatosplenomegaly, no masses and bowel sounds normal  MS: no gross musculoskeletal defects noted, no edema  SKIN: no suspicious lesions or rashes  NEURO: Normal strength and tone, mentation intact and speech normal  PSYCH: mentation appears normal, affect normal/bright    Diagnostic Test Results:  Labs reviewed in Epic    ASSESSMENT / PLAN:       ICD-10-CM    1. Encounter for Medicare annual wellness exam  Z00.00       2. Intractable chronic migraine without aura and without status migrainosus  G43.719 butalbital-acetaminophen-caffeine (ESGIC) -40 MG tablet     CBC with platelets     Comprehensive metabolic panel     CBC with platelets     Comprehensive metabolic panel      3. Hyperlipidemia LDL goal <130  E78.5 simvastatin (ZOCOR) 20 MG tablet     Lipid panel reflex to direct LDL Fasting     Lipid panel reflex to direct LDL Fasting          Patient has been advised of split billing requirements and indicates understanding: Yes      COUNSELING:  Reviewed preventive health counseling, as reflected in patient instructions      BMI:   Estimated body mass index is 30.65 kg/m  as calculated from the following:    Height as of this encounter: 1.6 m (5' 3\").    Weight as of this encounter: 78.5 kg (173 lb).         She reports that she quit smoking about 36 years ago. Her smoking use included cigarettes. She has a 7.5 pack-year smoking history. She has never used smokeless tobacco.      Appropriate preventive services were discussed with this " patient, including applicable screening as appropriate for fall prevention, nutrition, physical activity, Tobacco-use cessation, weight loss and cognition.  Checklist reviewing preventive services available has been given to the patient.    Reviewed patients plan of care and provided an AVS. The Basic Care Plan (routine screening as documented in Health Maintenance) for Susana meets the Care Plan requirement. This Care Plan has been established and reviewed with the Patient.          Heena Ny MD  [facility]    Identified Health Risks:  I have reviewed Opioid Use Disorder and Substance Use Disorder risk factors and made any needed referrals.

## 2024-01-09 NOTE — NURSING NOTE
"Chief Complaint   Patient presents with    Medicare Visit     BP (!) 136/96   Pulse 76   Temp 96.9  F (36.1  C) (Tympanic)   Resp 16   Ht 1.6 m (5' 3\")   Wt 78.5 kg (173 lb)   SpO2 99%   BMI 30.65 kg/m   Estimated body mass index is 30.65 kg/m  as calculated from the following:    Height as of this encounter: 1.6 m (5' 3\").    Weight as of this encounter: 78.5 kg (173 lb).  Patient presents to the clinic using No DME      Health Maintenance that is potentially due pending provider review:    Health Maintenance Due   Topic Date Due    RSV VACCINE (Pregnancy & 60+) (1 - 1-dose 60+ series) Never done    DTAP/TDAP/TD IMMUNIZATION (1 - Tdap) 04/02/2005    ZOSTER IMMUNIZATION (2 of 3) 08/13/2008    MEDICARE ANNUAL WELLNESS VISIT  01/17/2024    ANNUAL REVIEW OF HM ORDERS  01/17/2024                  "

## 2024-01-09 NOTE — PATIENT INSTRUCTIONS
Patient Education   Personalized Prevention Plan  You are due for the preventive services outlined below.  Your care team is available to assist you in scheduling these services.  If you have already completed any of these items, please share that information with your care team to update in your medical record.  Health Maintenance Due   Topic Date Due     RSV VACCINE (Pregnancy & 60+) (1 - 1-dose 60+ series) Never done     Diptheria Tetanus Pertussis (DTAP/TDAP/TD) Vaccine (1 - Tdap) 04/02/2005     Zoster (Shingles) Vaccine (2 of 3) 08/13/2008     Annual Wellness Visit  01/17/2024     ANNUAL REVIEW OF HM ORDERS  01/17/2024

## 2024-01-10 ENCOUNTER — TELEPHONE (OUTPATIENT)
Dept: FAMILY MEDICINE | Facility: CLINIC | Age: 84
End: 2024-01-10
Payer: COMMERCIAL

## 2024-01-10 DIAGNOSIS — G43.719 INTRACTABLE CHRONIC MIGRAINE WITHOUT AURA AND WITHOUT STATUS MIGRAINOSUS: ICD-10-CM

## 2024-01-10 RX ORDER — PROCHLORPERAZINE MALEATE 5 MG
5 TABLET ORAL 3 TIMES DAILY PRN
Qty: 30 TABLET | Refills: 0 | Status: SHIPPED | OUTPATIENT
Start: 2024-01-10

## 2024-01-10 NOTE — TELEPHONE ENCOUNTER
Patient calling. Patient is unsure why her Prochlorperazine (Compazine) 5MG tablet was removed from her list and not refilled? Patient is asking for refill      Preferred Pharmacy:   [facility] [address] [place] [address] - [address] [facility]  [address] [facility] [address]  Phone: [phone] Fax: [phone]    Could we send this information to you in [facility] or would you prefer to receive a phone call?:   Patient would prefer a phone call   Okay to leave a detailed message?: Yes at Home number on file [phone] (home)

## 2024-01-10 NOTE — TELEPHONE ENCOUNTER
I talked with Susana and she is asking for refill of the compazine.  She uses it when she gets a headache.  Gets nauseated with a headache. Uses maybe once or twice a year.  Dinorah Ramos RN

## 2024-03-06 NOTE — PROGRESS NOTES
"  SUBJECTIVE:   Susana Duenas is a 78 year old female who presents to clinic today for the following health issues:      Hyperlipidemia Follow-Up      Rate your low fat/cholesterol diet?: good    Taking statin?  Yes, no muscle aches from statin    Other lipid medications/supplements?:  none    Migraine Follow-Up    Headaches symptoms:  Stable     Frequency: varies     Duration of headaches: 3 hours if she takes her medications    Able to do normal daily activities/work with migraines: Yes, somewhat    Rescue/Relief medication:Esgic, campazine              Effectiveness: total relief    Preventative medication: None    Neurologic complications: No new stroke-like symptoms, loss of vision or speech, numbness or weakness    In the past 4 weeks, how often have you gone to Urgent Care or the emergency room because of your headaches?  0      Amount of exercise or physical activity: None    Problems taking medications regularly: No    Medication side effects: none    Diet: regular (no restrictions)            Problem list and histories reviewed & adjusted, as indicated.  Additional history: as documented    Labs reviewed in EPIC    Reviewed and updated as needed this visit by clinical staff  Tobacco  Allergies  Meds  Problems  Med Hx  Surg Hx  Fam Hx  Soc Hx        Reviewed and updated as needed this visit by Provider  Allergies  Meds  Problems         ROS:  Constitutional, HEENT, cardiovascular, pulmonary, gi and gu systems are negative, except as otherwise noted.    OBJECTIVE:                                                    /88 (BP Location: Right arm, Patient Position: Chair, Cuff Size: Adult Large)  Pulse 77  Temp 98.3  F (36.8  C) (Tympanic)  Ht 5' 4\" (1.626 m)  Wt 183 lb 6.4 oz (83.2 kg)  SpO2 99%  BMI 31.48 kg/m2  Body mass index is 31.48 kg/(m^2).  GENERAL APPEARANCE: healthy, alert and no distress  NECK: no adenopathy, no asymmetry, masses, or scars and thyroid normal to palpation  RESP: " lungs clear to auscultation - no rales, rhonchi or wheezes  CV: regular rates and rhythm, normal S1 S2, no S3 or S4 and no murmur, click or rub  MS: extremities normal- no gross deformities noted  PSYCH: mentation appears normal and affect normal/bright         ASSESSMENT/PLAN:                                                    1. Hyperlipidemia LDL goal <130  Adjust meds as indicated by above labs.   - simvastatin (ZOCOR) 20 MG tablet; Take 1 tablet (20 mg) by mouth At Bedtime  Dispense: 90 tablet; Refill: 3  - Basic metabolic panel  - Lipid panel reflex to direct LDL Fasting    2. Intractable chronic migraine without aura and without status migrainosus  Stable no change in treatment plan.   - prochlorperazine (COMPAZINE) 5 MG tablet; Take 1 tablet (5 mg) by mouth 3 times daily as needed for nausea (with migraine) for nausea.  Dispense: 30 tablet; Refill: 2  - butalbital-acetaminophen-caffeine (FIORICET/ESGIC) -40 MG per tablet; Take 1-2 tablets by mouth every 4 hours as needed for pain  Dispense: 100 tablet; Refill: 2  - TSH with free T4 reflex      Patient Instructions   Stay on the same medications     Labs today     Flu shot today     Recheck in one year sooner if concerns        Risks, benefits, side effects and rationale for treatment plan fully discussed with the patient and understanding expressed.   Heena Ny MD  [facility]   cough

## 2024-10-01 ENCOUNTER — TRANSCRIBE ORDERS (OUTPATIENT)
Dept: OTHER | Age: 84
End: 2024-10-01

## 2024-10-01 DIAGNOSIS — H02.831 DERMATOCHALASIS OF BOTH UPPER EYELIDS: Primary | ICD-10-CM

## 2024-10-01 DIAGNOSIS — H02.834 DERMATOCHALASIS OF BOTH UPPER EYELIDS: Primary | ICD-10-CM

## 2024-12-10 ENCOUNTER — PATIENT OUTREACH (OUTPATIENT)
Dept: CARE COORDINATION | Facility: CLINIC | Age: 84
End: 2024-12-10
Payer: COMMERCIAL

## 2024-12-24 ENCOUNTER — PATIENT OUTREACH (OUTPATIENT)
Dept: CARE COORDINATION | Facility: CLINIC | Age: 84
End: 2024-12-24
Payer: COMMERCIAL

## 2025-01-21 ENCOUNTER — OFFICE VISIT (OUTPATIENT)
Dept: FAMILY MEDICINE | Facility: CLINIC | Age: 85
End: 2025-01-21
Payer: COMMERCIAL

## 2025-01-21 VITALS
BODY MASS INDEX: 30.83 KG/M2 | DIASTOLIC BLOOD PRESSURE: 70 MMHG | WEIGHT: 174 LBS | RESPIRATION RATE: 14 BRPM | HEART RATE: 81 BPM | OXYGEN SATURATION: 98 % | HEIGHT: 63 IN | TEMPERATURE: 97.9 F | SYSTOLIC BLOOD PRESSURE: 134 MMHG

## 2025-01-21 DIAGNOSIS — Z00.00 ENCOUNTER FOR MEDICARE ANNUAL WELLNESS EXAM: ICD-10-CM

## 2025-01-21 DIAGNOSIS — G43.719 INTRACTABLE CHRONIC MIGRAINE WITHOUT AURA AND WITHOUT STATUS MIGRAINOSUS: ICD-10-CM

## 2025-01-21 DIAGNOSIS — E78.5 HYPERLIPIDEMIA LDL GOAL <130: Primary | ICD-10-CM

## 2025-01-21 LAB
ALBUMIN SERPL BCG-MCNC: 4.5 G/DL (ref 3.5–5.2)
ALP SERPL-CCNC: 61 U/L (ref 40–150)
ALT SERPL W P-5'-P-CCNC: 18 U/L (ref 0–50)
ANION GAP SERPL CALCULATED.3IONS-SCNC: 12 MMOL/L (ref 7–15)
AST SERPL W P-5'-P-CCNC: 28 U/L (ref 0–45)
BILIRUB SERPL-MCNC: 0.3 MG/DL
BUN SERPL-MCNC: 21.2 MG/DL (ref 8–23)
CALCIUM SERPL-MCNC: 10 MG/DL (ref 8.8–10.4)
CHLORIDE SERPL-SCNC: 105 MMOL/L (ref 98–107)
CHOLEST SERPL-MCNC: 156 MG/DL
CREAT SERPL-MCNC: 0.87 MG/DL (ref 0.51–0.95)
EGFRCR SERPLBLD CKD-EPI 2021: 66 ML/MIN/1.73M2
ERYTHROCYTE [DISTWIDTH] IN BLOOD BY AUTOMATED COUNT: 12.6 % (ref 10–15)
FASTING STATUS PATIENT QL REPORTED: YES
FASTING STATUS PATIENT QL REPORTED: YES
GLUCOSE SERPL-MCNC: 84 MG/DL (ref 70–99)
HCO3 SERPL-SCNC: 26 MMOL/L (ref 22–29)
HCT VFR BLD AUTO: 42.2 % (ref 35–47)
HDLC SERPL-MCNC: 54 MG/DL
HGB BLD-MCNC: 13.9 G/DL (ref 11.7–15.7)
LDLC SERPL CALC-MCNC: 86 MG/DL
MCH RBC QN AUTO: 31.3 PG (ref 26.5–33)
MCHC RBC AUTO-ENTMCNC: 32.9 G/DL (ref 31.5–36.5)
MCV RBC AUTO: 95 FL (ref 78–100)
NONHDLC SERPL-MCNC: 102 MG/DL
PLATELET # BLD AUTO: 197 10E3/UL (ref 150–450)
POTASSIUM SERPL-SCNC: 4.3 MMOL/L (ref 3.4–5.3)
PROT SERPL-MCNC: 7.3 G/DL (ref 6.4–8.3)
RBC # BLD AUTO: 4.44 10E6/UL (ref 3.8–5.2)
SODIUM SERPL-SCNC: 143 MMOL/L (ref 135–145)
TRIGL SERPL-MCNC: 82 MG/DL
WBC # BLD AUTO: 4.5 10E3/UL (ref 4–11)

## 2025-01-21 PROCEDURE — 36415 COLL VENOUS BLD VENIPUNCTURE: CPT | Performed by: FAMILY MEDICINE

## 2025-01-21 PROCEDURE — 80053 COMPREHEN METABOLIC PANEL: CPT | Performed by: FAMILY MEDICINE

## 2025-01-21 PROCEDURE — G2211 COMPLEX E/M VISIT ADD ON: HCPCS | Performed by: FAMILY MEDICINE

## 2025-01-21 PROCEDURE — 80061 LIPID PANEL: CPT | Performed by: FAMILY MEDICINE

## 2025-01-21 PROCEDURE — 85027 COMPLETE CBC AUTOMATED: CPT | Performed by: FAMILY MEDICINE

## 2025-01-21 PROCEDURE — G0439 PPPS, SUBSEQ VISIT: HCPCS | Performed by: FAMILY MEDICINE

## 2025-01-21 PROCEDURE — 99214 OFFICE O/P EST MOD 30 MIN: CPT | Mod: 25 | Performed by: FAMILY MEDICINE

## 2025-01-21 RX ORDER — PROCHLORPERAZINE MALEATE 5 MG/1
5 TABLET ORAL 3 TIMES DAILY PRN
Qty: 30 TABLET | Refills: 3 | Status: SHIPPED | OUTPATIENT
Start: 2025-01-21

## 2025-01-21 RX ORDER — SIMVASTATIN 20 MG
20 TABLET ORAL AT BEDTIME
Qty: 90 TABLET | Refills: 3 | Status: SHIPPED | OUTPATIENT
Start: 2025-01-21

## 2025-01-21 RX ORDER — BUTALBITAL, ACETAMINOPHEN AND CAFFEINE 50; 325; 40 MG/1; MG/1; MG/1
1-2 TABLET ORAL EVERY 4 HOURS PRN
Qty: 100 TABLET | Refills: 2 | Status: SHIPPED | OUTPATIENT
Start: 2025-01-21

## 2025-01-21 ASSESSMENT — PAIN SCALES - GENERAL: PAINLEVEL_OUTOF10: NO PAIN (0)

## 2025-01-21 NOTE — NURSING NOTE
"Chief Complaint   Patient presents with    Lipids     BP (!) 150/100   Pulse 81   Temp 97.9  F (36.6  C) (Tympanic)   Resp 14   Ht 1.588 m (5' 2.5\")   Wt 78.9 kg (174 lb)   SpO2 98%   BMI 31.32 kg/m   Estimated body mass index is 31.32 kg/m  as calculated from the following:    Height as of this encounter: 1.588 m (5' 2.5\").    Weight as of this encounter: 78.9 kg (174 lb).  Patient presents to the clinic using No DME      Health Maintenance that is potentially due pending provider review:    Health Maintenance Due   Topic Date Due    DTAP/TDAP/TD IMMUNIZATION (1 - Tdap) 04/02/2005    RSV VACCINE (1 - 1-dose 75+ series) Never done    LIPID  01/09/2025    ANNUAL REVIEW OF HM ORDERS  01/09/2025    MEDICARE ANNUAL WELLNESS VISIT  01/09/2025                  "

## 2025-01-21 NOTE — PATIENT INSTRUCTIONS
Patient Education   Preventive Care Advice   This is general advice given by our system to help you stay healthy. However, your care team may have specific advice just for you. Please talk to your care team about your preventive care needs.  Nutrition  Eat 5 or more servings of fruits and vegetables each day.  Try wheat bread, brown rice and whole grain pasta (instead of white bread, rice, and pasta).  Get enough calcium and vitamin D. Check the label on foods and aim for 100% of the RDA (recommended daily allowance).  Lifestyle  Exercise at least 150 minutes each week  (30 minutes a day, 5 days a week).  Do muscle strengthening activities 2 days a week. These help control your weight and prevent disease.  No smoking.  Wear sunscreen to prevent skin cancer.  Have a dental exam and cleaning every 6 months.  Yearly exams  See your health care team every year to talk about:  Any changes in your health.  Any medicines your care team has prescribed.  Preventive care, family planning, and ways to prevent chronic diseases.  Shots (vaccines)   HPV shots (up to age 26), if you've never had them before.  Hepatitis B shots (up to age 59), if you've never had them before.  COVID-19 shot: Get this shot when it's due.  Flu shot: Get a flu shot every year.  Tetanus shot: Get a tetanus shot every 10 years.  Pneumococcal, hepatitis A, and RSV shots: Ask your care team if you need these based on your risk.  Shingles shot (for age 50 and up)  General health tests  Diabetes screening:  Starting at age 35, Get screened for diabetes at least every 3 years.  If you are younger than age 35, ask your care team if you should be screened for diabetes.  Cholesterol test: At age 39, start having a cholesterol test every 5 years, or more often if advised.  Bone density scan (DEXA): At age 50, ask your care team if you should have this scan for osteoporosis (brittle bones).  Hepatitis C: Get tested at least once in your life.  STIs (sexually  transmitted infections)  Before age 24: Ask your care team if you should be screened for STIs.  After age 24: Get screened for STIs if you're at risk. You are at risk for STIs (including HIV) if:  You are sexually active with more than one person.  You don't use condoms every time.  You or a partner was diagnosed with a sexually transmitted infection.  If you are at risk for HIV, ask about PrEP medicine to prevent HIV.  Get tested for HIV at least once in your life, whether you are at risk for HIV or not.  Cancer screening tests  Cervical cancer screening: If you have a cervix, begin getting regular cervical cancer screening tests starting at age 21.  Breast cancer scan (mammogram): If you've ever had breasts, begin having regular mammograms starting at age 40. This is a scan to check for breast cancer.  Colon cancer screening: It is important to start screening for colon cancer at age 45.  Have a colonoscopy test every 10 years (or more often if you're at risk) Or, ask your provider about stool tests like a FIT test every year or Cologuard test every 3 years.  To learn more about your testing options, visit:   .  For help making a decision, visit:   https://bit.ly/kq96820.  Prostate cancer screening test: If you have a prostate, ask your care team if a prostate cancer screening test (PSA) at age 55 is right for you.  Lung cancer screening: If you are a current or former smoker ages 50 to 80, ask your care team if ongoing lung cancer screenings are right for you.  For informational purposes only. Not to replace the advice of your health care provider. Copyright   2023 [facility] [employer]. All rights reserved. Clinically reviewed by the [facility] Transitions Program. [employer] [id number] - REV 01/24.  Bladder Training: Care Instructions  Your Care Instructions     Bladder training is used to treat urge incontinence and stress incontinence. Urge incontinence means that the need to urinate comes on so fast  that you can't get to a toilet in time. Stress incontinence means that you leak urine because of pressure on your bladder. For example, it may happen when you laugh, cough, or lift something heavy.  Bladder training can increase how long you can wait before you have to urinate. It can also help your bladder hold more urine. And it can give you better control over the urge to urinate.  It is important to remember that bladder training takes a few weeks to a few months to make a difference. You may not see results right away, but don't give up.  Follow-up care is a key part of your treatment and safety. Be sure to make and go to all appointments, and call your doctor if you are having problems. It's also a good idea to know your test results and keep a list of the medicines you take.  How can you care for yourself at home?  Work with your doctor to come up with a bladder training program that is right for you. You may use one or more of the following methods.  Delayed urination  In the beginning, try to keep from urinating for 5 minutes after you first feel the need to go.  While you wait, take deep, slow breaths to relax. Kegel exercises can also help you delay the need to go to the bathroom.  After some practice, when you can easily wait 5 minutes to urinate, try to wait 10 minutes before you urinate.  Slowly increase the waiting period until you are able to control when you have to urinate.  Scheduled urination  Empty your bladder when you first wake up in the morning.  Schedule times throughout the day when you will urinate.  Start by going to the bathroom every hour, even if you don't need to go.  Slowly increase the time between trips to the bathroom.  When you have found a schedule that works well for you, keep doing it.  If you wake up during the night and have to urinate, do it. Apply your schedule to waking hours only.  Kegel exercises  These tighten and strengthen pelvic muscles, which can help you control  "the flow of urine. (If doing these exercises causes pain, stop doing them and talk with your doctor.) To do Kegel exercises:  Squeeze your muscles as if you were trying not to pass gas. Or squeeze your muscles as if you were stopping the flow of urine. Your belly, legs, and buttocks shouldn't move.  Hold the squeeze for 3 seconds, then relax for 5 to 10 seconds.  Start with 3 seconds, then add 1 second each week until you are able to squeeze for 10 seconds.  Repeat the exercise 10 times a session. Do 3 to 8 sessions a day.  When should you call for help?  Watch closely for changes in your health, and be sure to contact your doctor if:    Your incontinence is getting worse.     You do not get better as expected.   Where can you learn more?  Go to https://www.[employer].net/patiented  Enter V684 in the search box to learn more about \"Bladder Training: Care Instructions.\"  Current as of: April 30, 2024  Content Version: 14.3    2024 [employer].   Care instructions adapted under license by your healthcare professional. If you have questions about a medical condition or this instruction, always ask your healthcare professional. [employer] disclaims any warranty or liability for your use of this information.       "

## 2025-01-21 NOTE — PROGRESS NOTES
"  Assessment & Plan     Hyperlipidemia LDL goal <130  Adjust meds as indicated by above labs.   - Lipid panel reflex to direct LDL Non-fasting; Future  - simvastatin (ZOCOR) 20 MG tablet; Take 1 tablet (20 mg) by mouth at bedtime.  - Comprehensive metabolic panel; Future  - CBC with platelets; Future    Intractable chronic migraine without aura and without status migrainosus  Stable no change in treatment plan.   - butalbital-acetaminophen-caffeine (ESGIC) -40 MG tablet; Take 1-2 tablets by mouth every 4 hours as needed for pain.  - prochlorperazine (COMPAZINE) 5 MG tablet; Take 1 tablet (5 mg) by mouth 3 times daily as needed for nausea (with migraine). for nausea.    Encounter for Medicare annual wellness exam      The longitudinal plan of care for the diagnosis(es)/condition(s) as documented were addressed during this visit. Due to the added complexity in care, I will continue to support Susana in the subsequent management and with ongoing continuity of care.          BMI  Estimated body mass index is 31.32 kg/m  as calculated from the following:    Height as of this encounter: 1.588 m (5' 2.5\").    Weight as of this encounter: 78.9 kg (174 lb).   Weight management plan: Discussed healthy diet and exercise guidelines          Chalo Meza is a 84 year old, presenting for the following health issues:  Lipids        1/21/2025     2:42 PM   Additional Questions   Roomed by Noreen rooney   Accompanied by Self         1/21/2025     2:42 PM   Patient Reported Additional Medications   Patient reports taking the following new medications .     History of Present Illness       Hyperlipidemia:  She presents for follow up of hyperlipidemia.   She is taking medication to lower cholesterol. She is not having myalgia or other side effects to statin medications.    Reason for visit:  Yearly check    She eats 2-3 servings of fruits and vegetables daily.She consumes 0 sweetened beverage(s) daily.She exercises with enough " effort to increase her heart rate 10 to 19 minutes per day.  She exercises with enough effort to increase her heart rate 3 or less days per week.   She is taking medications regularly.         Annual Wellness Visit     Patient has been advised of split billing requirements and indicates understanding: Yes   Patient has a Health Care Directive on file  Did not discuss   In general, how would you rate your overall physical health? good  Do you have a special diet?  Regular (no restrictions)        2025   Exercise, Social Connection, Stress   Days per week of moderate/strenous exercise 0 days   Average minutes spent exercising at this level 0 min   Frequency of gathering with friends or relatives Three times   Feel stress (tense, anxious, or unable to sleep) Not at all     Do you see a dentist two times every year?  (!) NO  The patient was instructed to see the dentist every 6 months.   Have you been more tired than usual lately?  No  If you drink alcohol do you typically have >3 drinks per day or >7 drinks per week? No  Do you have a current opioid prescription? No  Do you use any other controlled substances or medications that are not prescribed by a provider? None  Social History     Tobacco Use    Smoking status: Former     Current packs/day: 0.00     Average packs/day: 0.5 packs/day for 15.0 years (7.5 ttl pk-yrs)     Types: Cigarettes     Start date: 1972     Quit date: 1987     Years since quittin.1    Smokeless tobacco: Never    Tobacco comments:     quit 20 years ago.  Smoked off and on for 20 years   Vaping Use    Vaping status: Never Used   Substance Use Topics    Alcohol use: No    Drug use: No       Needs assistance for the following daily activities: no assistance needed  Which of the following safety concerns are present in your home?  none identified   Do you (or your family members) have any concerns about your safety while driving?  No doesn't drive  Do you have any of the  following hearing concerns?: No hearing concerns  In the past 6 months, have you been bothered by leaking of urine? (!) YES   Information on urinary incontinence and treatment options given to patient.        1/4/2024   Social Factors   Worry food won't last until get money to buy more No   Food not last or not have enough money for food? No   Do you have housing? (Housing is defined as stable permanent housing and does not include staying ouside in a car, in a tent, in an abandoned building, in an overnight shelter, or couch-surfing.) Yes   Are you worried about losing your housing? No   Lack of transportation? No   Unable to get utilities (heat,electricity)? No          1/18/2025   Fall Risk   Fallen 2 or more times in the past year? No   Trouble with walking or balance? No          Today's PHQ-2 Score:       1/21/2025     2:34 PM   PHQ-2 ( 1999 [employer])   Q1: Little interest or pleasure in doing things 0   Q2: Feeling down, depressed or hopeless 0   PHQ-2 Score 0    Q1: Little interest or pleasure in doing things Not at all   Q2: Feeling down, depressed or hopeless Not at all   PHQ-2 Score 0       Patient-reported                         Reviewed and updated as needed this visit by Provider   Tobacco  Allergies  Meds  Problems  Med Hx  Surg Hx  Fam Hx                Current providers sharing in care for this patient include:  Patient Care Team:  Heena Ny MD as PCP - General (Family Practice)  Heena Ny MD as Assigned PCP    The following health maintenance items are reviewed in Epic and correct as of today:  Health Maintenance   Topic Date Due    DTAP/TDAP/TD IMMUNIZATION (1 - Tdap) 04/02/2005    RSV VACCINE (1 - 1-dose 75+ series) Never done    LIPID  01/09/2025    MEDICARE ANNUAL WELLNESS VISIT  01/21/2026    ANNUAL REVIEW OF HM ORDERS  01/21/2026    FALL RISK ASSESSMENT  01/21/2026    ADVANCE CARE PLANNING  01/09/2029    DEXA  12/01/2031    MIGRAINE ACTION PLAN  Completed     "PHQ-2 (once per calendar year)  Completed    INFLUENZA VACCINE  Completed    Pneumococcal Vaccine: 50+ Years  Completed    ZOSTER IMMUNIZATION  Completed    COVID-19 Vaccine  Completed    HPV IMMUNIZATION  Aged Out    MENINGITIS IMMUNIZATION  Aged Out    RSV MONOCLONAL ANTIBODY  Aged Out       Appropriate preventive services were discussed with this patient, including applicable screening as appropriate for fall prevention, nutrition, physical activity, Tobacco-use cessation, weight loss and cognition.  Checklist reviewing preventive services available has been given to the patient.          1/21/2025   Mini Cog   Mini-Cog Not Completed (choose reason) Patient declines            Hyperlipidemia Follow-Up    Are you regularly taking any medication or supplement to lower your cholesterol?   Yes- statin  Are you having muscle aches or other side effects that you think could be caused by your cholesterol lowering medication?  No    Migraine   Since your last clinic visit, how have your headaches changed?  No change  How often are you getting headaches or migraines? 3-4 per year    Are you able to do normal daily activities when you have a migraine? Yes  Are you taking rescue/relief medications? (Select all that apply) Other: butalbitol   How helpful is your rescue/relief medication?  I get total relief  Are you taking any medications to prevent migraines? (Select all that apply)  No  In the past 4 weeks, how often have you gone to urgent care or the emergency room because of your headaches?  0        Review of Systems  Constitutional, HEENT, cardiovascular, pulmonary, gi and gu systems are negative, except as otherwise noted.      Objective    /70   Pulse 81   Temp 97.9  F (36.6  C) (Tympanic)   Resp 14   Ht 1.588 m (5' 2.5\")   Wt 78.9 kg (174 lb)   SpO2 98%   BMI 31.32 kg/m    Body mass index is 31.32 kg/m .  Physical Exam   GENERAL: alert and no distress  EYES: Eyes grossly normal to inspection, PERRL and " conjunctivae and sclerae normal  HENT: ear canals and TM's normal, nose and mouth without ulcers or lesions  NECK: no adenopathy, no asymmetry, masses, or scars  RESP: lungs clear to auscultation - no rales, rhonchi or wheezes  CV: regular rate and rhythm, normal S1 S2, no S3 or S4, no murmur, click or rub, no peripheral edema  ABDOMEN: soft, nontender, no hepatosplenomegaly, no masses and bowel sounds normal  MS: no gross musculoskeletal defects noted, no edema  SKIN: no suspicious lesions or rashes  NEURO: Normal strength and tone, mentation intact and speech normal  PSYCH: mentation appears normal, affect normal/bright            Signed Electronically by: Heena Ny MD